# Patient Record
Sex: FEMALE | Race: WHITE | NOT HISPANIC OR LATINO | Employment: UNEMPLOYED | URBAN - METROPOLITAN AREA
[De-identification: names, ages, dates, MRNs, and addresses within clinical notes are randomized per-mention and may not be internally consistent; named-entity substitution may affect disease eponyms.]

---

## 2018-04-26 ENCOUNTER — APPOINTMENT (OUTPATIENT)
Dept: RADIOLOGY | Facility: MEDICAL CENTER | Age: 28
DRG: 446 | End: 2018-04-26
Attending: EMERGENCY MEDICINE
Payer: MEDICAID

## 2018-04-26 ENCOUNTER — HOSPITAL ENCOUNTER (INPATIENT)
Facility: MEDICAL CENTER | Age: 28
LOS: 4 days | DRG: 446 | End: 2018-04-30
Attending: EMERGENCY MEDICINE | Admitting: INTERNAL MEDICINE
Payer: MEDICAID

## 2018-04-26 DIAGNOSIS — R79.89 LFT ELEVATION: ICD-10-CM

## 2018-04-26 DIAGNOSIS — R10.11 RUQ PAIN: ICD-10-CM

## 2018-04-26 PROBLEM — K76.0 HEPATIC STEATOSIS: Status: ACTIVE | Noted: 2018-04-26

## 2018-04-26 LAB
ALBUMIN SERPL BCP-MCNC: 4.4 G/DL (ref 3.2–4.9)
ALBUMIN/GLOB SERPL: 1.5 G/DL
ALP SERPL-CCNC: 144 U/L (ref 30–99)
ALT SERPL-CCNC: 569 U/L (ref 2–50)
ANION GAP SERPL CALC-SCNC: 12 MMOL/L (ref 0–11.9)
APPEARANCE UR: ABNORMAL
AST SERPL-CCNC: 236 U/L (ref 12–45)
BACTERIA #/AREA URNS HPF: ABNORMAL /HPF
BASOPHILS # BLD AUTO: 0.7 % (ref 0–1.8)
BASOPHILS # BLD: 0.05 K/UL (ref 0–0.12)
BILIRUB SERPL-MCNC: 3.3 MG/DL (ref 0.1–1.5)
BILIRUB UR QL STRIP.AUTO: ABNORMAL
BUN SERPL-MCNC: 10 MG/DL (ref 8–22)
CALCIUM SERPL-MCNC: 9.8 MG/DL (ref 8.5–10.5)
CHLORIDE SERPL-SCNC: 104 MMOL/L (ref 96–112)
CO2 SERPL-SCNC: 23 MMOL/L (ref 20–33)
COLOR UR: ABNORMAL
CREAT SERPL-MCNC: 0.84 MG/DL (ref 0.5–1.4)
EOSINOPHIL # BLD AUTO: 0.3 K/UL (ref 0–0.51)
EOSINOPHIL NFR BLD: 4.2 % (ref 0–6.9)
EPI CELLS #/AREA URNS HPF: ABNORMAL /HPF
ERYTHROCYTE [DISTWIDTH] IN BLOOD BY AUTOMATED COUNT: 45.6 FL (ref 35.9–50)
GLOBULIN SER CALC-MCNC: 3 G/DL (ref 1.9–3.5)
GLUCOSE SERPL-MCNC: 92 MG/DL (ref 65–99)
GLUCOSE UR STRIP.AUTO-MCNC: NEGATIVE MG/DL
HCG SERPL QL: NEGATIVE
HCG UR QL: NEGATIVE
HCT VFR BLD AUTO: 42.7 % (ref 37–47)
HGB BLD-MCNC: 14.3 G/DL (ref 12–16)
IMM GRANULOCYTES # BLD AUTO: 0.03 K/UL (ref 0–0.11)
IMM GRANULOCYTES NFR BLD AUTO: 0.4 % (ref 0–0.9)
KETONES UR STRIP.AUTO-MCNC: >=160 MG/DL
LEUKOCYTE ESTERASE UR QL STRIP.AUTO: ABNORMAL
LIPASE SERPL-CCNC: 22 U/L (ref 11–82)
LYMPHOCYTES # BLD AUTO: 0.9 K/UL (ref 1–4.8)
LYMPHOCYTES NFR BLD: 12.5 % (ref 22–41)
MCH RBC QN AUTO: 30.4 PG (ref 27–33)
MCHC RBC AUTO-ENTMCNC: 33.5 G/DL (ref 33.6–35)
MCV RBC AUTO: 90.7 FL (ref 81.4–97.8)
MICRO URNS: ABNORMAL
MONOCYTES # BLD AUTO: 0.42 K/UL (ref 0–0.85)
MONOCYTES NFR BLD AUTO: 5.8 % (ref 0–13.4)
NEUTROPHILS # BLD AUTO: 5.5 K/UL (ref 2–7.15)
NEUTROPHILS NFR BLD: 76.4 % (ref 44–72)
NITRITE UR QL STRIP.AUTO: POSITIVE
NRBC # BLD AUTO: 0 K/UL
NRBC BLD-RTO: 0 /100 WBC
PH UR STRIP.AUTO: 5 [PH]
PLATELET # BLD AUTO: 268 K/UL (ref 164–446)
PMV BLD AUTO: 9.5 FL (ref 9–12.9)
POTASSIUM SERPL-SCNC: 4.1 MMOL/L (ref 3.6–5.5)
PROT SERPL-MCNC: 7.4 G/DL (ref 6–8.2)
PROT UR QL STRIP: NEGATIVE MG/DL
RBC # BLD AUTO: 4.71 M/UL (ref 4.2–5.4)
RBC # URNS HPF: ABNORMAL /HPF
RBC UR QL AUTO: NEGATIVE
SODIUM SERPL-SCNC: 139 MMOL/L (ref 135–145)
SP GR UR REFRACTOMETRY: 1.03
SP GR UR STRIP.AUTO: 1.03
UROBILINOGEN UR STRIP.AUTO-MCNC: 1 MG/DL
WBC # BLD AUTO: 7.2 K/UL (ref 4.8–10.8)
WBC #/AREA URNS HPF: ABNORMAL /HPF

## 2018-04-26 PROCEDURE — 84703 CHORIONIC GONADOTROPIN ASSAY: CPT

## 2018-04-26 PROCEDURE — 96375 TX/PRO/DX INJ NEW DRUG ADDON: CPT

## 2018-04-26 PROCEDURE — 85025 COMPLETE CBC W/AUTO DIFF WBC: CPT

## 2018-04-26 PROCEDURE — 700105 HCHG RX REV CODE 258: Performed by: INTERNAL MEDICINE

## 2018-04-26 PROCEDURE — 700105 HCHG RX REV CODE 258: Performed by: EMERGENCY MEDICINE

## 2018-04-26 PROCEDURE — 36415 COLL VENOUS BLD VENIPUNCTURE: CPT

## 2018-04-26 PROCEDURE — 76705 ECHO EXAM OF ABDOMEN: CPT

## 2018-04-26 PROCEDURE — 770006 HCHG ROOM/CARE - MED/SURG/GYN SEMI*

## 2018-04-26 PROCEDURE — 99223 1ST HOSP IP/OBS HIGH 75: CPT | Performed by: INTERNAL MEDICINE

## 2018-04-26 PROCEDURE — 700117 HCHG RX CONTRAST REV CODE 255: Performed by: EMERGENCY MEDICINE

## 2018-04-26 PROCEDURE — 96374 THER/PROPH/DIAG INJ IV PUSH: CPT

## 2018-04-26 PROCEDURE — 700111 HCHG RX REV CODE 636 W/ 250 OVERRIDE (IP): Performed by: EMERGENCY MEDICINE

## 2018-04-26 PROCEDURE — 96361 HYDRATE IV INFUSION ADD-ON: CPT

## 2018-04-26 PROCEDURE — 700111 HCHG RX REV CODE 636 W/ 250 OVERRIDE (IP): Performed by: INTERNAL MEDICINE

## 2018-04-26 PROCEDURE — 74177 CT ABD & PELVIS W/CONTRAST: CPT

## 2018-04-26 PROCEDURE — 80053 COMPREHEN METABOLIC PANEL: CPT

## 2018-04-26 PROCEDURE — 81001 URINALYSIS AUTO W/SCOPE: CPT

## 2018-04-26 PROCEDURE — 83690 ASSAY OF LIPASE: CPT

## 2018-04-26 PROCEDURE — 99285 EMERGENCY DEPT VISIT HI MDM: CPT

## 2018-04-26 PROCEDURE — 81025 URINE PREGNANCY TEST: CPT

## 2018-04-26 RX ORDER — CEFTRIAXONE 2 G/1
2 INJECTION, POWDER, FOR SOLUTION INTRAMUSCULAR; INTRAVENOUS ONCE
Status: COMPLETED | OUTPATIENT
Start: 2018-04-26 | End: 2018-04-26

## 2018-04-26 RX ORDER — KETOROLAC TROMETHAMINE 30 MG/ML
30 INJECTION, SOLUTION INTRAMUSCULAR; INTRAVENOUS EVERY 6 HOURS PRN
Status: DISCONTINUED | OUTPATIENT
Start: 2018-04-26 | End: 2018-04-30 | Stop reason: HOSPADM

## 2018-04-26 RX ORDER — AMOXICILLIN 500 MG/1
500 CAPSULE ORAL 4 TIMES DAILY
COMMUNITY
Start: 2018-04-09

## 2018-04-26 RX ORDER — SODIUM CHLORIDE 9 MG/ML
1000 INJECTION, SOLUTION INTRAVENOUS ONCE
Status: COMPLETED | OUTPATIENT
Start: 2018-04-26 | End: 2018-04-26

## 2018-04-26 RX ORDER — PROMETHAZINE HYDROCHLORIDE 25 MG/1
12.5-25 TABLET ORAL EVERY 4 HOURS PRN
Status: DISCONTINUED | OUTPATIENT
Start: 2018-04-26 | End: 2018-04-30 | Stop reason: HOSPADM

## 2018-04-26 RX ORDER — ONDANSETRON 4 MG/1
4 TABLET, ORALLY DISINTEGRATING ORAL EVERY 4 HOURS PRN
Status: DISCONTINUED | OUTPATIENT
Start: 2018-04-26 | End: 2018-04-30 | Stop reason: HOSPADM

## 2018-04-26 RX ORDER — PROMETHAZINE HYDROCHLORIDE 25 MG/1
12.5-25 SUPPOSITORY RECTAL EVERY 4 HOURS PRN
Status: DISCONTINUED | OUTPATIENT
Start: 2018-04-26 | End: 2018-04-30 | Stop reason: HOSPADM

## 2018-04-26 RX ORDER — BISACODYL 10 MG
10 SUPPOSITORY, RECTAL RECTAL
Status: DISCONTINUED | OUTPATIENT
Start: 2018-04-26 | End: 2018-04-30 | Stop reason: HOSPADM

## 2018-04-26 RX ORDER — IBUPROFEN 800 MG/1
800 TABLET ORAL EVERY 8 HOURS PRN
COMMUNITY

## 2018-04-26 RX ORDER — SODIUM CHLORIDE 9 MG/ML
INJECTION, SOLUTION INTRAVENOUS CONTINUOUS
Status: DISPENSED | OUTPATIENT
Start: 2018-04-26 | End: 2018-04-27

## 2018-04-26 RX ORDER — ONDANSETRON 2 MG/ML
4 INJECTION INTRAMUSCULAR; INTRAVENOUS ONCE
Status: COMPLETED | OUTPATIENT
Start: 2018-04-26 | End: 2018-04-26

## 2018-04-26 RX ORDER — DOXYCYCLINE HYCLATE 100 MG
100 TABLET ORAL 2 TIMES DAILY
COMMUNITY
Start: 2018-04-26

## 2018-04-26 RX ORDER — POLYETHYLENE GLYCOL 3350 17 G/17G
1 POWDER, FOR SOLUTION ORAL
Status: DISCONTINUED | OUTPATIENT
Start: 2018-04-26 | End: 2018-04-30 | Stop reason: HOSPADM

## 2018-04-26 RX ORDER — ACETAMINOPHEN 325 MG/1
650 TABLET ORAL EVERY 6 HOURS PRN
Status: DISCONTINUED | OUTPATIENT
Start: 2018-04-26 | End: 2018-04-30 | Stop reason: HOSPADM

## 2018-04-26 RX ORDER — ONDANSETRON 2 MG/ML
4 INJECTION INTRAMUSCULAR; INTRAVENOUS EVERY 4 HOURS PRN
Status: DISCONTINUED | OUTPATIENT
Start: 2018-04-26 | End: 2018-04-30 | Stop reason: HOSPADM

## 2018-04-26 RX ORDER — AMOXICILLIN 250 MG
2 CAPSULE ORAL 2 TIMES DAILY
Status: DISCONTINUED | OUTPATIENT
Start: 2018-04-26 | End: 2018-04-30 | Stop reason: HOSPADM

## 2018-04-26 RX ORDER — MORPHINE SULFATE 4 MG/ML
1 INJECTION, SOLUTION INTRAMUSCULAR; INTRAVENOUS EVERY 4 HOURS PRN
Status: DISCONTINUED | OUTPATIENT
Start: 2018-04-26 | End: 2018-04-30 | Stop reason: HOSPADM

## 2018-04-26 RX ADMIN — SODIUM CHLORIDE: 9 INJECTION, SOLUTION INTRAVENOUS at 20:57

## 2018-04-26 RX ADMIN — ONDANSETRON 4 MG: 2 INJECTION, SOLUTION INTRAMUSCULAR; INTRAVENOUS at 17:28

## 2018-04-26 RX ADMIN — IOHEXOL 100 ML: 350 INJECTION, SOLUTION INTRAVENOUS at 18:27

## 2018-04-26 RX ADMIN — ONDANSETRON HYDROCHLORIDE 4 MG: 2 INJECTION, SOLUTION INTRAMUSCULAR; INTRAVENOUS at 23:19

## 2018-04-26 RX ADMIN — SODIUM CHLORIDE 1000 ML: 9 INJECTION, SOLUTION INTRAVENOUS at 15:38

## 2018-04-26 RX ADMIN — CEFTRIAXONE SODIUM 2 G: 2 INJECTION, POWDER, FOR SOLUTION INTRAMUSCULAR; INTRAVENOUS at 17:20

## 2018-04-26 RX ADMIN — KETOROLAC TROMETHAMINE 30 MG: 30 INJECTION, SOLUTION INTRAMUSCULAR at 23:04

## 2018-04-26 ASSESSMENT — ENCOUNTER SYMPTOMS
HEARTBURN: 0
BLURRED VISION: 0
COUGH: 0
FEVER: 0
FLANK PAIN: 0
HEADACHES: 0
ABDOMINAL PAIN: 1
VOMITING: 1
PALPITATIONS: 0
SHORTNESS OF BREATH: 0
HEMOPTYSIS: 0
CHILLS: 0
LOSS OF CONSCIOUSNESS: 0
WEAKNESS: 0
NAUSEA: 1
SPUTUM PRODUCTION: 0
DEPRESSION: 0
TINGLING: 0
DIARRHEA: 0
DIZZINESS: 0
WEIGHT LOSS: 0
VOMITING: 0
CONSTIPATION: 0
NECK PAIN: 0
MYALGIAS: 0
FOCAL WEAKNESS: 0

## 2018-04-26 ASSESSMENT — PAIN SCALES - GENERAL
PAINLEVEL_OUTOF10: 0
PAINLEVEL_OUTOF10: 0
PAINLEVEL_OUTOF10: 9
PAINLEVEL_OUTOF10: 5
PAINLEVEL_OUTOF10: 0

## 2018-04-26 ASSESSMENT — PATIENT HEALTH QUESTIONNAIRE - PHQ9
SUM OF ALL RESPONSES TO PHQ9 QUESTIONS 1 AND 2: 0
2. FEELING DOWN, DEPRESSED, IRRITABLE, OR HOPELESS: NOT AT ALL
1. LITTLE INTEREST OR PLEASURE IN DOING THINGS: NOT AT ALL

## 2018-04-26 ASSESSMENT — LIFESTYLE VARIABLES: DO YOU DRINK ALCOHOL: NO

## 2018-04-26 ASSESSMENT — PAIN SCALES - WONG BAKER
WONGBAKER_NUMERICALRESPONSE: DOESN'T HURT AT ALL
WONGBAKER_NUMERICALRESPONSE: DOESN'T HURT AT ALL

## 2018-04-26 NOTE — ED TRIAGE NOTES
"Chief Complaint   Patient presents with   • RUQ Pain     off and on since monday. Radiates to bad   • N/V     2-3 x emesis a day.      Pt was seen in [place] for same and told she had elevated liver enzymes.  Pt had her gallbladder removed in January.   /80   Pulse 81   Temp 37 °C (98.6 °F) (Temporal)   Resp 18   Ht 1.702 m (5' 7\")   Wt 95.7 kg (211 lb)   SpO2 96%   BMI 33.05 kg/m²   Pt placed back in lobby, educated on triage process, and told to inform staff of any change in condition.     "

## 2018-04-26 NOTE — ED PROVIDER NOTES
ED Provider Note    Scribed for Cruz Perez M.D. by Greer Pizarro. 4/26/2018, 3:09 PM.    Primary care provider: No primary care provider on file.  Means of arrival: Walk-in/Wheel-chair  History obtained from: Patient  History limited by: None     CHIEF COMPLAINT  Chief Complaint   Patient presents with   • RUQ Pain     off and on since monday. Radiates to bad   • N/V     2-3 x emesis a day.      HPI  Nila Ferro is a 27 y.o. female who presents to the Emergency Department for evaluation of intermittent right upper quadrant abdominal pain which onset three days ago. Other recent symptoms include nausea and vomiting. She reports having roughly 2-3 episodes per day. Patient reports she initially had back pain approximately five days ago and the abdominal pain began two days later. She was evaluated at [facility] and was informed her liver enzymes are elevated. The provider at [facility] wanted to perform an ultrasound but that patient was in too much pain and was advised to come to the ED. Patient was going to be Care Flighted in but her pain temporarily improved, so she was able to drive herself. She lives 2-3 hours outside of [place]. She notices her pain will onset and worsen after eating and improves after vomiting. She denies diarrhea, constipation and dysuria. She has history of cholecystectomy. Patient denies smoking cigarettes. She reports drinking occasionally. Patient has a two year old daughter.     REVIEW OF SYSTEMS  Review of Systems   Gastrointestinal: Positive for abdominal pain, nausea and vomiting. Negative for constipation and diarrhea.   Genitourinary: Negative for dysuria.   All other systems reviewed and are negative.  C.    PAST MEDICAL HISTORY   No chronic illnesses reported.     SURGICAL HISTORY  Cholecystectomy.     SOCIAL HISTORY  Social History   Substance Use Topics   • Smoking status: Never Smoker   • Smokeless tobacco: Never Used   • Alcohol use No      History  "  Drug Use No       FAMILY HISTORY  History reviewed. No pertinent family history.    CURRENT MEDICATIONS  Patient denies taking any daily medications.      ALLERGIES  No Known Allergies    PHYSICAL EXAM  VITAL SIGNS: /80   Pulse 81   Temp 37 °C (98.6 °F) (Temporal)   Resp 18   Ht 1.702 m (5' 7\")   Wt 95.7 kg (211 lb)   SpO2 96%   BMI 33.05 kg/m²   Vitals reviewed.  Constitutional: Well developed, Well nourished, No acute distress, Non-toxic appearance.   HENT: Normocephalic, Atraumatic, Bilateral external ears normal, Oropharynx dry, No oral exudates, Nose normal.   Eyes: PERRL, EOMI, Conjunctiva normal, No discharge.   Neck: Normal range of motion, No tenderness, Supple, No stridor.   Cardiovascular: Normal heart rate, Normal rhythm, No murmurs, No rubs, No gallops.   Thorax & Lungs: Normal breath sounds, No respiratory distress, No wheezing, No chest tenderness.   Abdomen: Bowel sounds normal, Soft, tender the right upper quadrant.  No peritonitis.  Skin: Warm, Dry, No erythema, No rash.   Back: No tenderness, No CVA tenderness.   Genitalia: Deferred.   Rectal: Deferred.   Musculoskeletal: Good range of motion in all major joints. No edema. No tenderness to palpation or major deformities noted.   Neurologic: Alert, Normal motor function, Normal sensory function, No focal deficits noted.   Psychiatric: Affect normal    LABS  Results for orders placed or performed during the hospital encounter of 04/26/18   CBC WITH DIFFERENTIAL   Result Value Ref Range    WBC 7.2 4.8 - 10.8 K/uL    RBC 4.71 4.20 - 5.40 M/uL    Hemoglobin 14.3 12.0 - 16.0 g/dL    Hematocrit 42.7 37.0 - 47.0 %    MCV 90.7 81.4 - 97.8 fL    MCH 30.4 27.0 - 33.0 pg    MCHC 33.5 (L) 33.6 - 35.0 g/dL    RDW 45.6 35.9 - 50.0 fL    Platelet Count 268 164 - 446 K/uL    MPV 9.5 9.0 - 12.9 fL    Neutrophils-Polys 76.40 (H) 44.00 - 72.00 %    Lymphocytes 12.50 (L) 22.00 - 41.00 %    Monocytes 5.80 0.00 - 13.40 %    Eosinophils 4.20 0.00 - 6.90 % "    Basophils 0.70 0.00 - 1.80 %    Immature Granulocytes 0.40 0.00 - 0.90 %    Nucleated RBC 0.00 /100 WBC    Neutrophils (Absolute) 5.50 2.00 - 7.15 K/uL    Lymphs (Absolute) 0.90 (L) 1.00 - 4.80 K/uL    Monos (Absolute) 0.42 0.00 - 0.85 K/uL    Eos (Absolute) 0.30 0.00 - 0.51 K/uL    Baso (Absolute) 0.05 0.00 - 0.12 K/uL    Immature Granulocytes (abs) 0.03 0.00 - 0.11 K/uL    NRBC (Absolute) 0.00 K/uL   COMP METABOLIC PANEL   Result Value Ref Range    Sodium 139 135 - 145 mmol/L    Potassium 4.1 3.6 - 5.5 mmol/L    Chloride 104 96 - 112 mmol/L    Co2 23 20 - 33 mmol/L    Anion Gap 12.0 (H) 0.0 - 11.9    Glucose 92 65 - 99 mg/dL    Bun 10 8 - 22 mg/dL    Creatinine 0.84 0.50 - 1.40 mg/dL    Calcium 9.8 8.5 - 10.5 mg/dL    AST(SGOT) 236 (H) 12 - 45 U/L    Alkaline Phosphatase 144 (H) 30 - 99 U/L    Total Bilirubin 3.3 (H) 0.1 - 1.5 mg/dL    Albumin 4.4 3.2 - 4.9 g/dL    Total Protein 7.4 6.0 - 8.2 g/dL    Globulin 3.0 1.9 - 3.5 g/dL    A-G Ratio 1.5 g/dL    ALT(SGPT) 569 (H) 2 - 50 U/L   LIPASE   Result Value Ref Range    Lipase 22 11 - 82 U/L   URINALYSIS CULTURE, IF INDICATED   Result Value Ref Range    Color DK Yellow     Character Cloudy (A)     Specific Gravity 1.029 <1.035    Ph 5.0 5.0 - 8.0    Glucose Negative Negative mg/dL    Ketones >=160 Negative mg/dL    Protein Negative Negative mg/dL    Bilirubin Large (A) Negative    Urobilinogen, Urine 1.0 Negative    Nitrite Positive (A) Negative    Leukocyte Esterase Small (A) Negative    Occult Blood Negative Negative    Micro Urine Req Microscopic    URINE MICROSCOPIC (W/UA)   Result Value Ref Range    WBC 0-2 /hpf    RBC 5-10 (A) /hpf    Bacteria Few (A) None /hpf    Epithelial Cells Moderate (A) /hpf   ESTIMATED GFR   Result Value Ref Range    GFR If African American >60 >60 mL/min/1.73 m 2    GFR If Non African American >60 >60 mL/min/1.73 m 2   BETA-HCG QUALITATIVE URINE   Result Value Ref Range    Beta-Hcg Urine Negative Negative   REFRACTOMETER SG   Result  Value Ref Range    Specific Gravity 1.030       All labs reviewed by me.    RADIOLOGY  CT-ABDOMEN-PELVIS WITH   Final Result      Diffuse low-attenuation in the liver consistent with fatty infiltration.      Status post cholecystectomy.      US-LIVER AND BILIARY TREE   Final Result      1.  Hepatic steatosis.      2.  Previous cholecystectomy.           The radiologist's interpretation of all radiological studies have been reviewed by me.    COURSE & MEDICAL DECISION MAKING  Pertinent Labs & Imaging studies reviewed. (See chart for details)    Reviewed past medical records. Patient has no previous ED visits.     3:09 PM Patient seen and examined at bedside. The patient presents with right upper quadrant pain, and the differential diagnosis includes but is not limited to biliary stone, not in the gallbladder.  Hepatitis, colitis, renal colic, UTI, pacted stone.. Ordered for US-liver and biliary tree, CT abdomen pelvis, CBC, CMP, lipase, urinalysis, POC urinalysis and POC urine pregnancy to evaluate. Patient will be resuscitated with 1 liter IV fluids secondary to her vomiting.      *Patient was given IV fluids secondary to fluid loss from poor oral intake.  She is reassessed after this and felt much better.    .  The patient has elevated LFTs suggestive of possible obstruction.  She's had her gallbladder out.  Biliary ultrasound does not show any abnormality.  CT is nondiagnostic.    The patient has a CT this shows fatty infiltration, but no other abnormality.  Urinalysis is positive for nitrates.    At this point, the patient is to be admitted for an MR CP or ERCP to exclude biliary obstruction.  The patient has abnormal LFTs and persistent episodic severe right upper quadrant pain.  She is admitted to the hospital for continued workup and treatment.    FINAL IMPRESSION  1. RUQ pain    2. LFT elevation          Greer ACOSTA (Scribe), kaden scribing for, and in the presence of, Cruz Perez,  M.D..    Electronically signed by: Greer Pizarro (Scribe), 4/26/2018    ICruz M.D. personally performed the services described in this documentation, as scribed by Greer Pizarro in my presence, and it is both accurate and complete.    The note accurately reflects work and decisions made by me.  Cruz Perez  4/26/2018  6:39 PM

## 2018-04-27 ENCOUNTER — APPOINTMENT (OUTPATIENT)
Dept: RADIOLOGY | Facility: MEDICAL CENTER | Age: 28
DRG: 446 | End: 2018-04-27
Attending: INTERNAL MEDICINE
Payer: MEDICAID

## 2018-04-27 LAB
ALBUMIN SERPL BCP-MCNC: 3.7 G/DL (ref 3.2–4.9)
ALBUMIN/GLOB SERPL: 1.4 G/DL
ALP SERPL-CCNC: 147 U/L (ref 30–99)
ALT SERPL-CCNC: 441 U/L (ref 2–50)
ANION GAP SERPL CALC-SCNC: 13 MMOL/L (ref 0–11.9)
AST SERPL-CCNC: 150 U/L (ref 12–45)
BASOPHILS # BLD AUTO: 0.7 % (ref 0–1.8)
BASOPHILS # BLD: 0.04 K/UL (ref 0–0.12)
BILIRUB SERPL-MCNC: 2.4 MG/DL (ref 0.1–1.5)
BUN SERPL-MCNC: 10 MG/DL (ref 8–22)
CALCIUM SERPL-MCNC: 8.8 MG/DL (ref 8.5–10.5)
CHLORIDE SERPL-SCNC: 107 MMOL/L (ref 96–112)
CO2 SERPL-SCNC: 19 MMOL/L (ref 20–33)
CREAT SERPL-MCNC: 0.83 MG/DL (ref 0.5–1.4)
EOSINOPHIL # BLD AUTO: 0.15 K/UL (ref 0–0.51)
EOSINOPHIL NFR BLD: 2.5 % (ref 0–6.9)
ERYTHROCYTE [DISTWIDTH] IN BLOOD BY AUTOMATED COUNT: 47.7 FL (ref 35.9–50)
GLOBULIN SER CALC-MCNC: 2.7 G/DL (ref 1.9–3.5)
GLUCOSE SERPL-MCNC: 67 MG/DL (ref 65–99)
HCT VFR BLD AUTO: 41.1 % (ref 37–47)
HGB BLD-MCNC: 13.3 G/DL (ref 12–16)
IMM GRANULOCYTES # BLD AUTO: 0.02 K/UL (ref 0–0.11)
IMM GRANULOCYTES NFR BLD AUTO: 0.3 % (ref 0–0.9)
LYMPHOCYTES # BLD AUTO: 1.3 K/UL (ref 1–4.8)
LYMPHOCYTES NFR BLD: 21.5 % (ref 22–41)
MAGNESIUM SERPL-MCNC: 1.9 MG/DL (ref 1.5–2.5)
MCH RBC QN AUTO: 30.4 PG (ref 27–33)
MCHC RBC AUTO-ENTMCNC: 32.4 G/DL (ref 33.6–35)
MCV RBC AUTO: 94.1 FL (ref 81.4–97.8)
MONOCYTES # BLD AUTO: 0.35 K/UL (ref 0–0.85)
MONOCYTES NFR BLD AUTO: 5.8 % (ref 0–13.4)
NEUTROPHILS # BLD AUTO: 4.18 K/UL (ref 2–7.15)
NEUTROPHILS NFR BLD: 69.2 % (ref 44–72)
NRBC # BLD AUTO: 0 K/UL
NRBC BLD-RTO: 0 /100 WBC
PLATELET # BLD AUTO: 238 K/UL (ref 164–446)
PMV BLD AUTO: 9.6 FL (ref 9–12.9)
POTASSIUM SERPL-SCNC: 4.1 MMOL/L (ref 3.6–5.5)
PROT SERPL-MCNC: 6.4 G/DL (ref 6–8.2)
RBC # BLD AUTO: 4.37 M/UL (ref 4.2–5.4)
SODIUM SERPL-SCNC: 139 MMOL/L (ref 135–145)
WBC # BLD AUTO: 6 K/UL (ref 4.8–10.8)

## 2018-04-27 PROCEDURE — 700111 HCHG RX REV CODE 636 W/ 250 OVERRIDE (IP): Performed by: INTERNAL MEDICINE

## 2018-04-27 PROCEDURE — 80053 COMPREHEN METABOLIC PANEL: CPT

## 2018-04-27 PROCEDURE — 74181 MRI ABDOMEN W/O CONTRAST: CPT

## 2018-04-27 PROCEDURE — 700105 HCHG RX REV CODE 258: Performed by: INTERNAL MEDICINE

## 2018-04-27 PROCEDURE — 83735 ASSAY OF MAGNESIUM: CPT

## 2018-04-27 PROCEDURE — 36415 COLL VENOUS BLD VENIPUNCTURE: CPT

## 2018-04-27 PROCEDURE — 85025 COMPLETE CBC W/AUTO DIFF WBC: CPT

## 2018-04-27 PROCEDURE — 99232 SBSQ HOSP IP/OBS MODERATE 35: CPT | Performed by: HOSPITALIST

## 2018-04-27 PROCEDURE — 770006 HCHG ROOM/CARE - MED/SURG/GYN SEMI*

## 2018-04-27 RX ORDER — SODIUM CHLORIDE 9 MG/ML
INJECTION, SOLUTION INTRAVENOUS CONTINUOUS
Status: DISCONTINUED | OUTPATIENT
Start: 2018-04-27 | End: 2018-04-30 | Stop reason: HOSPADM

## 2018-04-27 RX ADMIN — ONDANSETRON HYDROCHLORIDE 4 MG: 2 INJECTION, SOLUTION INTRAMUSCULAR; INTRAVENOUS at 13:04

## 2018-04-27 RX ADMIN — MORPHINE SULFATE 1 MG: 4 INJECTION INTRAVENOUS at 09:27

## 2018-04-27 RX ADMIN — SODIUM CHLORIDE: 9 INJECTION, SOLUTION INTRAVENOUS at 08:13

## 2018-04-27 RX ADMIN — SODIUM CHLORIDE: 9 INJECTION, SOLUTION INTRAVENOUS at 16:42

## 2018-04-27 RX ADMIN — ONDANSETRON HYDROCHLORIDE 4 MG: 2 INJECTION, SOLUTION INTRAMUSCULAR; INTRAVENOUS at 19:13

## 2018-04-27 RX ADMIN — PROCHLORPERAZINE EDISYLATE 10 MG: 5 INJECTION INTRAMUSCULAR; INTRAVENOUS at 21:05

## 2018-04-27 RX ADMIN — MORPHINE SULFATE 1 MG: 4 INJECTION INTRAVENOUS at 16:39

## 2018-04-27 RX ADMIN — SODIUM CHLORIDE: 9 INJECTION, SOLUTION INTRAVENOUS at 21:06

## 2018-04-27 RX ADMIN — ONDANSETRON HYDROCHLORIDE 4 MG: 2 INJECTION, SOLUTION INTRAMUSCULAR; INTRAVENOUS at 09:18

## 2018-04-27 ASSESSMENT — ENCOUNTER SYMPTOMS
RESPIRATORY NEGATIVE: 1
NAUSEA: 1
VOMITING: 0
ABDOMINAL PAIN: 1
NEUROLOGICAL NEGATIVE: 1
PSYCHIATRIC NEGATIVE: 1
CARDIOVASCULAR NEGATIVE: 1
MUSCULOSKELETAL NEGATIVE: 1

## 2018-04-27 ASSESSMENT — PAIN SCALES - GENERAL
PAINLEVEL_OUTOF10: 0
PAINLEVEL_OUTOF10: 7
PAINLEVEL_OUTOF10: 1

## 2018-04-27 NOTE — ED NOTES
Med Rec completed per Pt at bedside  Allergies Reviewed  Ok per Pt to discuss medications with visitor/s present    Pt started 10 day course of DOXYCYCLINE 4/26    Pt reports taking 3 days of a 7 day course of AMOXIL 4/9-4/11

## 2018-04-27 NOTE — ED NOTES
RECEIVED REPORT FROM RN, PT IS AAOX4, PT IS IN NAD, PT HAS NO PAIN  AT THIS TIME. PT IS BEING ADMITTED, PT WAS TOLD OF POC.

## 2018-04-27 NOTE — CARE PLAN
Problem: Communication  Goal: The ability to communicate needs accurately and effectively will improve  Outcome: PROGRESSING AS EXPECTED      Problem: Knowledge Deficit  Goal: Knowledge of disease process/condition, treatment plan, diagnostic tests, and medications will improve  Outcome: PROGRESSING AS EXPECTED    Goal: Knowledge of the prescribed therapeutic regimen will improve  Outcome: PROGRESSING AS EXPECTED

## 2018-04-27 NOTE — PROGRESS NOTES
28 yo F  Full Code  NKA  Admit 4/26- RUQ pain with N/V for 3 days    Assessment done    A+Ox4    RA    Pt states pain 1/10- declines medication    Hypoactive BS x4  LBM PTA, 4/25  NPO, sips with meds    R AC 20g PIV CDI, NS running at 100 ml/hr    Bed low and locked, call light and belongings in reach, hourly rounding in place, pt calls appropriately for assistance    No fall risk per Ellen Jacobs assessment    Discusses POC- MRI (MRI sheet complete) and possible ERCP    All needs met at this time

## 2018-04-27 NOTE — H&P
Hospital Medicine History and Physical    Date of Service  4/26/2018    Chief Complaint  Chief Complaint   Patient presents with   • RUQ Pain     off and on since monday. Radiates to bad   • N/V     2-3 x emesis a day.        History of Presenting Illness  27 y.o. female who presented 4/26/2018 with above chief complaint. Patient presents all the way from [facility] and she's had recurrent right upper quadrant pain with associated nausea and vomiting for the last several days. Patient had presented to outside hospital this past Monday in [facility] where she apparently had normal labs but no imaging was sent home with supportive treatment. She describes a right upper quadrant pain as stabbing in nature that comes and goes and is episodic and sometimes wraps around to her back pain seems to be exacerbated by food intake and relieved by no food. She was trying to take some ibuprofen home with little relief and denies any obvious jaundice of skin. She says this pain feels somewhat similar to her prior cholecystectomy. She denies any associated diarrhea but does have some nausea and vomiting with it and the pain at home was a 10 out of 10 and is currently a 5 out of 10. Given that the pain was so severe she finally took one of her mom's Norco's which didn't subside the pain a bit.      Primary Care Physician  No primary care provider on file.    Code Status  fc/fc    Review of Systems  Review of Systems   Constitutional: Positive for malaise/fatigue. Negative for chills, fever and weight loss.   HENT: Negative for hearing loss.    Eyes: Negative for blurred vision.   Respiratory: Negative for cough, hemoptysis, sputum production and shortness of breath.    Cardiovascular: Negative for chest pain, palpitations and leg swelling.   Gastrointestinal: Positive for abdominal pain and nausea. Negative for diarrhea, heartburn and vomiting.   Genitourinary: Negative for dysuria, flank pain and urgency.   Musculoskeletal:  Negative for myalgias and neck pain.   Neurological: Negative for dizziness, tingling, focal weakness, loss of consciousness, weakness and headaches.   Psychiatric/Behavioral: Negative for depression.   All other systems reviewed and are negative.         Past Medical History  Prior gallbladder issues    Surgical History  Laparoscopic cholecystectomy in 2018    Medications  No current facility-administered medications on file prior to encounter.      No current outpatient prescriptions on file prior to encounter.       Family History  COPD    Social History  Social History   Substance Use Topics   • Smoking status: Never Smoker   • Smokeless tobacco: Never Used   • Alcohol use No       Allergies  No Known Allergies     Physical Exam  Laboratory   Hemodynamics  Temp (24hrs), Av.8 °C (98.3 °F), Min:36.1 °C (97 °F), Max:37.1 °C (98.8 °F)   Temperature: 36.1 °C (97 °F)  Pulse  Av  Min: 65  Max: 81    Blood Pressure: 113/63, NIBP: (!) 95/57      Respiratory      Respiration: 18, Pulse Oximetry: 95 %             Physical Exam   Constitutional: She is oriented to person, place, and time. She appears well-developed and well-nourished. No distress.   HENT:   Head: Normocephalic and atraumatic.   Mouth/Throat: No oropharyngeal exudate.   Eyes: Pupils are equal, round, and reactive to light. Scleral icterus (very faint) is present.   Neck: Normal range of motion. Neck supple. No thyromegaly present.   Cardiovascular: Normal rate, regular rhythm, normal heart sounds and intact distal pulses.    No murmur heard.  Pulmonary/Chest: Effort normal and breath sounds normal. No respiratory distress. She has no wheezes.   Abdominal: Soft. Bowel sounds are normal. She exhibits no distension. There is tenderness. There is no rebound and no guarding.   +galdamez's sign     Musculoskeletal: Normal range of motion. She exhibits no edema or tenderness.   Neurological: She is alert and oriented to person, place, and time. No  cranial nerve deficit.   Skin: Skin is warm and dry. No rash noted.   Psychiatric: She has a normal mood and affect.   Nursing note and vitals reviewed.      Recent Labs      04/26/18   1536   WBC  7.2   RBC  4.71   HEMOGLOBIN  14.3   HEMATOCRIT  42.7   MCV  90.7   MCH  30.4   MCHC  33.5*   RDW  45.6   PLATELETCT  268   MPV  9.5     Recent Labs      04/26/18   1536   SODIUM  139   POTASSIUM  4.1   CHLORIDE  104   CO2  23   GLUCOSE  92   BUN  10   CREATININE  0.84   CALCIUM  9.8                   Imaging  CT-ABDOMEN-PELVIS WITH   Final Result      Diffuse low-attenuation in the liver consistent with fatty infiltration.      Status post cholecystectomy.      US-LIVER AND BILIARY TREE   Final Result      1.  Hepatic steatosis.      2.  Previous cholecystectomy.         [id number]    (Results Pending)        Assessment/Plan     I anticipate this patient will require at least two midnights for appropriate medical management, necessitating inpatient admission.    * RUQ pain- (present on admission)   Assessment & Plan    -Certainly concerning for possible passed gallstone and associated choledocholithiasis, imaging here shows fatty liver and normal common bile duct with no obvious cholelithiasis  -Given her symptoms and elevated LFTs serially concerning that she passed a gallstone. She is already status post cholecystectomy  -We'll perform MRCP  -Trend LFTs and bilirubin  -No evidence of clear infections assigned therefore defer antibiotics  -If MRCP is positive we'll need to consult GI for possible ERCP  -Pain control and IV fluids and nothing by mouth        Hepatic steatosis- (present on admission)   Assessment & Plan    -Secondary to metabolic syndrome        Elevated LFTs- (present on admission)   Assessment & Plan    -See above            VTE prophylaxis SCD's.

## 2018-04-27 NOTE — PROGRESS NOTES
Assume pt care. Pt a/o x3, VSS, No acute issues overnight. Pt rounds Q1-2hr with assessment of 4p's. IV assessment and care given. Pt education provided base on admission, care plan, current medications, and PRN. Pt admit for RUQ pain, N/V x4 days and elevated LFTs. Pt has hx of lap wong. NPO since admission. Pending MRI tomorrow.     2 RN skin check - no skin breakdown noted.     Results for NUZHAT MARIEE (MRN [id number]) as of 4/27/2018 05:19   Ref. Range 4/26/2018 15:36    4/27/2018 04:12   AST(SGOT) Latest Ref Range: 12 - 45 U/L 236 (H)    150 (H)   ALT(SGPT) Latest Ref Range: 2 - 50 U/L 569 (H)    441 (H)       PLAN -   We'll perform MRCP  -Trend LFTs and bilirubin  -No evidence of clear infections assigned therefore defer antibiotics  -If MRCP is positive we'll need to consult GI for possible ERCP  -Pain control and IV fluids and nothing by mouth

## 2018-04-27 NOTE — PROGRESS NOTES
[facility] Hospitalist Progress Note    Date of Service: 2018    Chief Complaint  27 y.o. female admitted 2018 with ruq abdominal pain, n/v    Interval Problem Update  Still has nausea    ruq pain, sharp, intensity 6/10, constant, no radiation    Afebrile    mrcp ordered, not yet done    Consultants/Specialty  none    Disposition  home        Review of Systems   Constitutional: Positive for malaise/fatigue.   HENT: Negative.    Respiratory: Negative.    Cardiovascular: Negative.    Gastrointestinal: Positive for abdominal pain and nausea. Negative for vomiting.   Genitourinary: Negative.    Musculoskeletal: Negative.    Neurological: Negative.    Psychiatric/Behavioral: Negative.    All other systems reviewed and are negative.     Physical Exam  Laboratory/Imaging   Hemodynamics  Temp (24hrs), Av.6 °C (97.8 °F), Min:36 °C (96.8 °F), Max:37.1 °C (98.8 °F)   Temperature: 36.1 °C (97 °F)  Pulse  Av.9  Min: 65  Max: 81    Blood Pressure: 103/57, NIBP: (!) 95/57      Respiratory      Respiration: 16, Pulse Oximetry: 96 %             Fluids    Intake/Output Summary (Last 24 hours) at 18 1314  Last data filed at 18 0400   Gross per 24 hour   Intake             1200 ml   Output                0 ml   Net             1200 ml       Nutrition  Orders Placed This Encounter   Procedures   • Diet NPO     Standing Status:   Standing     Number of Occurrences:   1     Order Specific Question:   Restrict to:     Answer:   Sips with Medications [3]     Physical Exam   Constitutional: She is oriented to person, place, and time. No distress.   Eyes: Left eye exhibits no discharge.   Neck: No tracheal deviation present. No thyromegaly present.   Cardiovascular: Normal rate.  Exam reveals friction rub.    No murmur heard.  Pulmonary/Chest: No respiratory distress. She has no wheezes. She has no rales. She exhibits no tenderness.   Abdominal: She exhibits distension. She exhibits no mass. There is tenderness  (epigastric). There is no rebound and no guarding.   Musculoskeletal: She exhibits no edema or deformity.   Neurological: She is alert and oriented to person, place, and time. No cranial nerve deficit. Coordination normal.   Skin: No rash noted. She is not diaphoretic. No erythema. No pallor.       Recent Labs      04/26/18   1536  04/27/18   0412   WBC  7.2  6.0   RBC  4.71  4.37   HEMOGLOBIN  14.3  13.3   HEMATOCRIT  42.7  41.1   MCV  90.7  94.1   MCH  30.4  30.4   MCHC  33.5*  32.4*   RDW  45.6  47.7   PLATELETCT  268  238   MPV  9.5  9.6     Recent Labs      04/26/18   1536  04/27/18   0412   SODIUM  139  139   POTASSIUM  4.1  4.1   CHLORIDE  104  107   CO2  23  19*   GLUCOSE  92  67   BUN  10  10   CREATININE  0.84  0.83   CALCIUM  9.8  8.8                      Assessment/Plan     * RUQ pain- (present on admission)   Assessment & Plan    Ultrasound imaging here shows fatty liver and normal common bile duct with no obvious cholelithiasis  - She is already status post cholecystectomy  -We'll perform MRCP  -Trend LFTs and bilirubin  -No evidence of clear infections assigned therefore defer antibiotics  -If MRCP is positive we'll need to consult GI for possible ERCP  -Pain control and IV fluids and nothing by mouth    hydrate        Hepatic steatosis- (present on admission)   Assessment & Plan    -Secondary to metabolic syndrome        Elevated LFTs- (present on admission)   Assessment & Plan    -See above          Quality-Core Measures   Singleton catheter::  No Singleton  DVT prophylaxis pharmacological::  Not indicated at this time, ambulatory      Check am cbc  Check am cmp

## 2018-04-28 PROBLEM — K80.50 COMMON BILE DUCT STONE: Status: ACTIVE | Noted: 2018-04-26

## 2018-04-28 LAB
ALBUMIN SERPL BCP-MCNC: 3.5 G/DL (ref 3.2–4.9)
ALBUMIN/GLOB SERPL: 1.3 G/DL
ALP SERPL-CCNC: 137 U/L (ref 30–99)
ALT SERPL-CCNC: 324 U/L (ref 2–50)
ANION GAP SERPL CALC-SCNC: 9 MMOL/L (ref 0–11.9)
AST SERPL-CCNC: 121 U/L (ref 12–45)
BASOPHILS # BLD AUTO: 0.8 % (ref 0–1.8)
BASOPHILS # BLD: 0.05 K/UL (ref 0–0.12)
BILIRUB SERPL-MCNC: 1.7 MG/DL (ref 0.1–1.5)
BUN SERPL-MCNC: 6 MG/DL (ref 8–22)
CALCIUM SERPL-MCNC: 8.5 MG/DL (ref 8.5–10.5)
CHLORIDE SERPL-SCNC: 106 MMOL/L (ref 96–112)
CO2 SERPL-SCNC: 19 MMOL/L (ref 20–33)
CREAT SERPL-MCNC: 0.85 MG/DL (ref 0.5–1.4)
EOSINOPHIL # BLD AUTO: 0.25 K/UL (ref 0–0.51)
EOSINOPHIL NFR BLD: 3.8 % (ref 0–6.9)
ERYTHROCYTE [DISTWIDTH] IN BLOOD BY AUTOMATED COUNT: 46.3 FL (ref 35.9–50)
GLOBULIN SER CALC-MCNC: 2.6 G/DL (ref 1.9–3.5)
GLUCOSE SERPL-MCNC: 83 MG/DL (ref 65–99)
HCT VFR BLD AUTO: 41.3 % (ref 37–47)
HGB BLD-MCNC: 13.1 G/DL (ref 12–16)
IMM GRANULOCYTES # BLD AUTO: 0.02 K/UL (ref 0–0.11)
IMM GRANULOCYTES NFR BLD AUTO: 0.3 % (ref 0–0.9)
LYMPHOCYTES # BLD AUTO: 1.83 K/UL (ref 1–4.8)
LYMPHOCYTES NFR BLD: 27.9 % (ref 22–41)
MCH RBC QN AUTO: 29.7 PG (ref 27–33)
MCHC RBC AUTO-ENTMCNC: 31.7 G/DL (ref 33.6–35)
MCV RBC AUTO: 93.7 FL (ref 81.4–97.8)
MONOCYTES # BLD AUTO: 0.43 K/UL (ref 0–0.85)
MONOCYTES NFR BLD AUTO: 6.6 % (ref 0–13.4)
NEUTROPHILS # BLD AUTO: 3.98 K/UL (ref 2–7.15)
NEUTROPHILS NFR BLD: 60.6 % (ref 44–72)
NRBC # BLD AUTO: 0 K/UL
NRBC BLD-RTO: 0 /100 WBC
PLATELET # BLD AUTO: 231 K/UL (ref 164–446)
PMV BLD AUTO: 9.1 FL (ref 9–12.9)
POTASSIUM SERPL-SCNC: 4.3 MMOL/L (ref 3.6–5.5)
PROT SERPL-MCNC: 6.1 G/DL (ref 6–8.2)
RBC # BLD AUTO: 4.41 M/UL (ref 4.2–5.4)
SODIUM SERPL-SCNC: 134 MMOL/L (ref 135–145)
WBC # BLD AUTO: 6.6 K/UL (ref 4.8–10.8)

## 2018-04-28 PROCEDURE — 770006 HCHG ROOM/CARE - MED/SURG/GYN SEMI*

## 2018-04-28 PROCEDURE — 99232 SBSQ HOSP IP/OBS MODERATE 35: CPT | Performed by: HOSPITALIST

## 2018-04-28 PROCEDURE — 85025 COMPLETE CBC W/AUTO DIFF WBC: CPT

## 2018-04-28 PROCEDURE — 700111 HCHG RX REV CODE 636 W/ 250 OVERRIDE (IP): Performed by: INTERNAL MEDICINE

## 2018-04-28 PROCEDURE — 36415 COLL VENOUS BLD VENIPUNCTURE: CPT

## 2018-04-28 PROCEDURE — 80053 COMPREHEN METABOLIC PANEL: CPT

## 2018-04-28 PROCEDURE — 700105 HCHG RX REV CODE 258: Performed by: INTERNAL MEDICINE

## 2018-04-28 RX ADMIN — MORPHINE SULFATE 1 MG: 4 INJECTION INTRAVENOUS at 20:34

## 2018-04-28 RX ADMIN — KETOROLAC TROMETHAMINE 30 MG: 30 INJECTION, SOLUTION INTRAMUSCULAR at 18:49

## 2018-04-28 RX ADMIN — ONDANSETRON HYDROCHLORIDE 4 MG: 2 INJECTION, SOLUTION INTRAMUSCULAR; INTRAVENOUS at 11:08

## 2018-04-28 RX ADMIN — KETOROLAC TROMETHAMINE 30 MG: 30 INJECTION, SOLUTION INTRAMUSCULAR at 03:45

## 2018-04-28 RX ADMIN — MORPHINE SULFATE 1 MG: 4 INJECTION INTRAVENOUS at 12:41

## 2018-04-28 RX ADMIN — SODIUM CHLORIDE: 9 INJECTION, SOLUTION INTRAVENOUS at 13:05

## 2018-04-28 RX ADMIN — KETOROLAC TROMETHAMINE 30 MG: 30 INJECTION, SOLUTION INTRAMUSCULAR at 11:08

## 2018-04-28 RX ADMIN — SODIUM CHLORIDE: 9 INJECTION, SOLUTION INTRAVENOUS at 20:35

## 2018-04-28 RX ADMIN — ONDANSETRON HYDROCHLORIDE 4 MG: 2 INJECTION, SOLUTION INTRAMUSCULAR; INTRAVENOUS at 22:06

## 2018-04-28 ASSESSMENT — PAIN SCALES - GENERAL
PAINLEVEL_OUTOF10: 4
PAINLEVEL_OUTOF10: 5
PAINLEVEL_OUTOF10: 6
PAINLEVEL_OUTOF10: 6
PAINLEVEL_OUTOF10: 7
PAINLEVEL_OUTOF10: 5

## 2018-04-28 ASSESSMENT — ENCOUNTER SYMPTOMS
NEUROLOGICAL NEGATIVE: 1
CARDIOVASCULAR NEGATIVE: 1
ABDOMINAL PAIN: 1
NAUSEA: 1
MUSCULOSKELETAL NEGATIVE: 1
VOMITING: 0
DIARRHEA: 1
RESPIRATORY NEGATIVE: 1
PSYCHIATRIC NEGATIVE: 1

## 2018-04-28 NOTE — CONSULTS
DATE OF SERVICE:  04/28/2018    CONSULTING PHYSICIAN:  Kvng Harrell DO    REASON FOR CONSULTATION:  Choledocholithiasis.    HISTORY OF PRESENT ILLNESS:  The patient is a very pleasant 27-year-old female   who underwent laparoscopic cholecystectomy in [place], [place] approximately   01/12/2018.  Patient reports that 8 days ago she started having epigastric and   right upper quadrant abdominal pain that radiated to her back.  This pain was   associated with nausea and vomiting as well as some resolution of the pain   since then.  Patient reports that she was pain free recently; however, it is   coming back at the time of exam.  Patient denies any current nausea, vomiting.    Patient denies any fever, chills, chest pain.  Patient does have shortness   of breath during the pain episodes.  Patient denies any bloody stools.    Patient does report dark urine during this time.    PAST MEDICAL HISTORY:  Cholelithiasis.    PAST SURGICAL HISTORY:  Cholecystectomy.    FAMILY HISTORY:  Noncontributory.    SOCIAL HISTORY:  Patient admits to rare alcohol use.  Patient denies any   illicit drug use or tobacco use.    MEDICATIONS:  Patient denies any prescription or over-the-counter medications.    REVIEW OF SYSTEMS:  A 14-point review of systems was obtained and found to be   negative except those in the HPI.    PHYSICAL EXAMINATION:  GENERAL:  No acute distress, alert, oriented x3.  VITAL SIGNS:  Stable.  HEENT:  PERRLA. Extraocular movements intact.  Sclerae are anicteric.  HEART:  Regular rate and rhythm.  S1, S2.  No murmurs auscultated.  LUNGS:  Clear to auscultation bilaterally.  ABDOMEN:  Bowel sounds present x4, soft.  Positive tenderness in the   epigastrium and right upper quadrant.  The majority of it in right upper   quadrant.  No guarding or rebound.  No ascites.  EXTREMITIES:  No edema noted bilateral lower extremities.  NEUROLOGIC:  Grossly intact.  SKIN:  No jaundice.  PSYCHIATRIC:  Affect is normal.    RESULTS:   White blood cell count 6.6, , , alkaline phosphatase   137, total bilirubin 1.7, and albumin 3.5.    IMAGING:  MRCP from 04/27/2018 shows postoperative cholecystectomy, mild   intrahepatic biliary ductal dilatation and mild-to-moderate extrahepatic   biliary ductal dilatation, hepatic duct about 10 mm, bile duct 9.5 mm,   choledocholithiasis with a 5 mm calculus in the distal common bile duct.    ASSESSMENT AND PLAN:  1.  Choledocholithiasis.  Plan for endoscopic retrograde   cholangiopancreatography tomorrow.  Patient has no signs of cholangitis at   this time.  Watch closely for any worsening or please call if that happens.    No need for antibiotics as of yet.  We will plan for ERCP with sphincterotomy   and stone extraction with or without biliary stenting, depending on what we   find during the time of the procedure.  If we were able to successfully   extract the choledocholith, then patient will require no further operative   treatment.  If the patient is unable to be accessed through ERCP, may need   surgical evaluation; however, this is unlikely.  The patient understands the   risks and benefits of the procedure including perforation, infection,   bleeding, and post-ERCP pancreatitis.  The patient is at increased risk for   post-ERCP pancreatitis that she is a young female; however, we will take all   appropriate measures to try to prevent this.  Patient understands and wishes   to proceed.  2.  Abdominal pain secondary to above.  3.  Hyperbilirubinemia secondary to choledocholithiasis.  Plan for ERCP.  4.  Cholelithiasis history.  Patient is status post cholecystectomy in January 2018.  5.  Clear liquids okay for today.  Patient needs to be n.p.o. after midnight   in anticipation of ERCP tomorrow.       ____________________________________     DO FLAVIA Cruz / NIKOLE    DD:  04/28/2018 12:29:39  DT:  04/28/2018 12:44:22    D#:  [id number]  Job#:  [id number]

## 2018-04-28 NOTE — CARE PLAN
Problem: Communication  Goal: The ability to communicate needs accurately and effectively will improve  Outcome: PROGRESSING SLOWER THAN EXPECTED  Updated in plan of care, needs to see a GI.     Problem: Infection  Goal: Will remain free from infection  Outcome: PROGRESSING AS EXPECTED  No infection continue to monitor.

## 2018-04-28 NOTE — PROGRESS NOTES
Patient AxO x4, VSS, medicated for nausea with compazine. Patient on a clear liquid diet until midnight, then will be NPO. Patient voiding adequately, last BM PTA. Patient up self, ambulating well. All needs met at this time, call light in reach.

## 2018-04-28 NOTE — CARE PLAN
Problem: Discharge Barriers/Planning  Goal: Patient's continuum of care needs will be met  Outcome: PROGRESSING AS EXPECTED  Abd MRI reveals a stone in the bile duct. Patient plans for MRCP today.    Problem: Pain Management  Goal: Pain level will decrease to patient's comfort goal  Outcome: PROGRESSING AS EXPECTED  Patient utilizes prn pain medications, heat packs, and distractions for pain management.

## 2018-04-29 ENCOUNTER — APPOINTMENT (OUTPATIENT)
Dept: RADIOLOGY | Facility: MEDICAL CENTER | Age: 28
DRG: 446 | End: 2018-04-29
Attending: INTERNAL MEDICINE
Payer: MEDICAID

## 2018-04-29 LAB
ALBUMIN SERPL BCP-MCNC: 3.3 G/DL (ref 3.2–4.9)
ALBUMIN/GLOB SERPL: 1.3 G/DL
ALP SERPL-CCNC: 134 U/L (ref 30–99)
ALT SERPL-CCNC: 271 U/L (ref 2–50)
ANION GAP SERPL CALC-SCNC: 6 MMOL/L (ref 0–11.9)
AST SERPL-CCNC: 103 U/L (ref 12–45)
BILIRUB SERPL-MCNC: 1.2 MG/DL (ref 0.1–1.5)
BUN SERPL-MCNC: 7 MG/DL (ref 8–22)
CALCIUM SERPL-MCNC: 8.3 MG/DL (ref 8.5–10.5)
CHLORIDE SERPL-SCNC: 109 MMOL/L (ref 96–112)
CO2 SERPL-SCNC: 23 MMOL/L (ref 20–33)
CREAT SERPL-MCNC: 0.98 MG/DL (ref 0.5–1.4)
ERYTHROCYTE [DISTWIDTH] IN BLOOD BY AUTOMATED COUNT: 46.5 FL (ref 35.9–50)
GLOBULIN SER CALC-MCNC: 2.5 G/DL (ref 1.9–3.5)
GLUCOSE SERPL-MCNC: 95 MG/DL (ref 65–99)
HCT VFR BLD AUTO: 37.5 % (ref 37–47)
HGB BLD-MCNC: 12.4 G/DL (ref 12–16)
MCH RBC QN AUTO: 30.3 PG (ref 27–33)
MCHC RBC AUTO-ENTMCNC: 33.1 G/DL (ref 33.6–35)
MCV RBC AUTO: 91.7 FL (ref 81.4–97.8)
PLATELET # BLD AUTO: 226 K/UL (ref 164–446)
PMV BLD AUTO: 9.6 FL (ref 9–12.9)
POTASSIUM SERPL-SCNC: 4 MMOL/L (ref 3.6–5.5)
PROT SERPL-MCNC: 5.8 G/DL (ref 6–8.2)
RBC # BLD AUTO: 4.09 M/UL (ref 4.2–5.4)
SODIUM SERPL-SCNC: 138 MMOL/L (ref 135–145)
WBC # BLD AUTO: 6.6 K/UL (ref 4.8–10.8)

## 2018-04-29 PROCEDURE — 160048 HCHG OR STATISTICAL LEVEL 1-5: Performed by: INTERNAL MEDICINE

## 2018-04-29 PROCEDURE — 770006 HCHG ROOM/CARE - MED/SURG/GYN SEMI*

## 2018-04-29 PROCEDURE — 700111 HCHG RX REV CODE 636 W/ 250 OVERRIDE (IP): Performed by: INTERNAL MEDICINE

## 2018-04-29 PROCEDURE — 700101 HCHG RX REV CODE 250

## 2018-04-29 PROCEDURE — 160009 HCHG ANES TIME/MIN: Performed by: INTERNAL MEDICINE

## 2018-04-29 PROCEDURE — A9270 NON-COVERED ITEM OR SERVICE: HCPCS | Performed by: INTERNAL MEDICINE

## 2018-04-29 PROCEDURE — 99232 SBSQ HOSP IP/OBS MODERATE 35: CPT | Performed by: HOSPITALIST

## 2018-04-29 PROCEDURE — 0FC98ZZ EXTIRPATION OF MATTER FROM COMMON BILE DUCT, VIA NATURAL OR ARTIFICIAL OPENING ENDOSCOPIC: ICD-10-PCS | Performed by: INTERNAL MEDICINE

## 2018-04-29 PROCEDURE — 700102 HCHG RX REV CODE 250 W/ 637 OVERRIDE(OP)

## 2018-04-29 PROCEDURE — 502240 HCHG MISC OR SUPPLY RC 0272: Performed by: INTERNAL MEDICINE

## 2018-04-29 PROCEDURE — 80053 COMPREHEN METABOLIC PANEL: CPT

## 2018-04-29 PROCEDURE — 160002 HCHG RECOVERY MINUTES (STAT): Performed by: INTERNAL MEDICINE

## 2018-04-29 PROCEDURE — 160035 HCHG PACU - 1ST 60 MINS PHASE I: Performed by: INTERNAL MEDICINE

## 2018-04-29 PROCEDURE — 500066 HCHG BITE BLOCK, ECT: Performed by: INTERNAL MEDICINE

## 2018-04-29 PROCEDURE — 74328 X-RAY BILE DUCT ENDOSCOPY: CPT

## 2018-04-29 PROCEDURE — 700111 HCHG RX REV CODE 636 W/ 250 OVERRIDE (IP)

## 2018-04-29 PROCEDURE — 160208 HCHG ENDO MINUTES - EA ADDL 1 MIN LEVEL 4: Performed by: INTERNAL MEDICINE

## 2018-04-29 PROCEDURE — 85027 COMPLETE CBC AUTOMATED: CPT

## 2018-04-29 PROCEDURE — 700105 HCHG RX REV CODE 258: Performed by: INTERNAL MEDICINE

## 2018-04-29 PROCEDURE — 700102 HCHG RX REV CODE 250 W/ 637 OVERRIDE(OP): Performed by: INTERNAL MEDICINE

## 2018-04-29 PROCEDURE — 36415 COLL VENOUS BLD VENIPUNCTURE: CPT

## 2018-04-29 PROCEDURE — 160036 HCHG PACU - EA ADDL 30 MINS PHASE I: Performed by: INTERNAL MEDICINE

## 2018-04-29 PROCEDURE — A9270 NON-COVERED ITEM OR SERVICE: HCPCS

## 2018-04-29 PROCEDURE — BF101ZZ FLUOROSCOPY OF BILE DUCTS USING LOW OSMOLAR CONTRAST: ICD-10-PCS | Performed by: INTERNAL MEDICINE

## 2018-04-29 PROCEDURE — 110371 HCHG SHELL REV 272: Performed by: INTERNAL MEDICINE

## 2018-04-29 PROCEDURE — 160203 HCHG ENDO MINUTES - 1ST 30 MINS LEVEL 4: Performed by: INTERNAL MEDICINE

## 2018-04-29 RX ORDER — OXYCODONE HCL 5 MG/5 ML
SOLUTION, ORAL ORAL
Status: COMPLETED
Start: 2018-04-29 | End: 2018-04-29

## 2018-04-29 RX ADMIN — STANDARDIZED SENNA CONCENTRATE AND DOCUSATE SODIUM 2 TABLET: 8.6; 5 TABLET, FILM COATED ORAL at 21:45

## 2018-04-29 RX ADMIN — OXYCODONE HYDROCHLORIDE 10 MG: 5 SOLUTION ORAL at 17:03

## 2018-04-29 RX ADMIN — PROCHLORPERAZINE EDISYLATE 5 MG: 5 INJECTION INTRAMUSCULAR; INTRAVENOUS at 17:20

## 2018-04-29 RX ADMIN — MORPHINE SULFATE 1 MG: 4 INJECTION INTRAVENOUS at 03:54

## 2018-04-29 RX ADMIN — PROCHLORPERAZINE EDISYLATE 10 MG: 5 INJECTION INTRAMUSCULAR; INTRAVENOUS at 03:53

## 2018-04-29 RX ADMIN — ONDANSETRON HYDROCHLORIDE 4 MG: 2 INJECTION, SOLUTION INTRAMUSCULAR; INTRAVENOUS at 13:14

## 2018-04-29 RX ADMIN — MORPHINE SULFATE 1 MG: 4 INJECTION INTRAVENOUS at 21:45

## 2018-04-29 RX ADMIN — FENTANYL CITRATE 50 MCG: 50 INJECTION, SOLUTION INTRAMUSCULAR; INTRAVENOUS at 17:24

## 2018-04-29 RX ADMIN — ONDANSETRON HYDROCHLORIDE 4 MG: 2 INJECTION, SOLUTION INTRAMUSCULAR; INTRAVENOUS at 21:42

## 2018-04-29 RX ADMIN — SODIUM CHLORIDE: 9 INJECTION, SOLUTION INTRAVENOUS at 04:00

## 2018-04-29 ASSESSMENT — PAIN SCALES - GENERAL
PAINLEVEL_OUTOF10: 5
PAINLEVEL_OUTOF10: 4
PAINLEVEL_OUTOF10: 7
PAINLEVEL_OUTOF10: 0
PAINLEVEL_OUTOF10: 7
PAINLEVEL_OUTOF10: 7
PAINLEVEL_OUTOF10: 0

## 2018-04-29 ASSESSMENT — ENCOUNTER SYMPTOMS
ABDOMINAL PAIN: 1
RESPIRATORY NEGATIVE: 1
CARDIOVASCULAR NEGATIVE: 1
PSYCHIATRIC NEGATIVE: 1
NAUSEA: 1
MUSCULOSKELETAL NEGATIVE: 1
DIARRHEA: 1
VOMITING: 0
NEUROLOGICAL NEGATIVE: 1

## 2018-04-29 NOTE — CARE PLAN
Problem: Bowel/Gastric:  Goal: Normal bowel function is maintained or improved  Outcome: PROGRESSING AS EXPECTED  Nausea is managed at this time, patient has not had emesis all day.    Problem: Pain Management  Goal: Pain level will decrease to patient's comfort goal  Outcome: PROGRESSING AS EXPECTED  Patient managing pain with PRN medications, heat packs and distraction

## 2018-04-29 NOTE — PROGRESS NOTES
Gastroenterology Progress Note     Author: Kvng Harrell   Date & Time Created: 4/29/2018 3:13 PM    Chief Complaint:  Abdominal pain     Interval History:  Overnight N/V, abdominal pain radiating to back. Denies fever, chills, chest pain, SOB, GI bleeding.     Review of Systems:  ROS    Physical Exam:  Physical Exam   Constitutional: She is oriented to person, place, and time. She appears well-developed and well-nourished.   HENT:   Head: Normocephalic and atraumatic.   Eyes: Conjunctivae are normal. Pupils are equal, round, and reactive to light.   Neck: Normal range of motion. Neck supple.   Cardiovascular: Normal rate, regular rhythm and normal heart sounds.    Pulmonary/Chest: Effort normal and breath sounds normal. No respiratory distress.   Abdominal: Soft. She exhibits no distension. There is no rebound and no guarding.   +RUQ TTP mild    Musculoskeletal: Normal range of motion.   Neurological: She is alert and oriented to person, place, and time.   Skin: Skin is warm and dry.   Psychiatric: She has a normal mood and affect. Her behavior is normal.       Labs:        Invalid input(s): [id number]      Recent Labs      04/27/18 0412 04/28/18 0324 04/29/18   0311   SODIUM  139  134*  138   POTASSIUM  4.1  4.3  4.0   CHLORIDE  107  106  109   CO2  19*  19*  23   BUN  10  6*  7*   CREATININE  0.83  0.85  0.98   MAGNESIUM  1.9   --    --    CALCIUM  8.8  8.5  8.3*     Recent Labs      04/26/18   1536  04/27/18 0412  04/28/18   0324 04/29/18   0311   ALTSGPT  569*  441*  324*  271*   ASTSGOT  236*  150*  121*  103*   ALKPHOSPHAT  144*  147*  137*  134*   TBILIRUBIN  3.3*  2.4*  1.7*  1.2   LIPASE  22   --    --    --    GLUCOSE  92  67  83  95     Recent Labs      04/27/18 0412  04/28/18   0324  04/29/18   0310   RBC  4.37  4.41  4.09*   HEMOGLOBIN  13.3  13.1  12.4   HEMATOCRIT  41.1  41.3  37.5   PLATELETCT  238  231  226     Recent Labs      04/26/18   1536  04/27/18   0412  04/28/18   0324   18   0310  18   0311   WBC  7.2  6.0  6.6  6.6   --    NEUTSPOLYS  76.40*  69.20  60.60   --    --    LYMPHOCYTES  12.50*  21.50*  27.90   --    --    MONOCYTES  5.80  5.80  6.60   --    --    EOSINOPHILS  4.20  2.50  3.80   --    --    BASOPHILS  0.70  0.70  0.80   --    --    ASTSGOT  236*  150*  121*   --   103*   ALTSGPT  569*  441*  324*   --   271*   ALKPHOSPHAT  144*  147*  137*   --   134*   TBILIRUBIN  3.3*  2.4*  1.7*   --   1.2     Hemodynamics:  Temp (24hrs), Av.2 °C (98.9 °F), Min:36.9 °C (98.4 °F), Max:37.3 °C (99.2 °F)  Temperature: 37.3 °C (99.1 °F)  Pulse  Av.1  Min: 63  Max: 89   Blood Pressure: 106/75     Respiratory:    Respiration: 16, Pulse Oximetry: 95 %           Fluids:    Intake/Output Summary (Last 24 hours) at 18 1513  Last data filed at 18 1300   Gross per 24 hour   Intake             2800 ml   Output              102 ml   Net             2698 ml        GI/Nutrition:  Orders Placed This Encounter   Procedures   • DIET NPO     Standing Status:   Standing     Number of Occurrences:   8     Order Specific Question:   Restrict to:     Answer:   Strict [1]     Medical Decision Making, by Problem:  Active Hospital Problems    Diagnosis   • *Common bile duct stone [K80.50]   • Elevated LFTs [R79.89]   • Hepatic steatosis [K76.0]       Plan:  1.  Choledocholithiasis.  ERCP now. Patient has no signs of cholangitis at   this time.  No need for antibiotics as of yet.  We will plan for ERCP with sphincterotomy   and stone extraction with or without biliary stenting, depending on what we   find during the time of the procedure.  If we were able to successfully   extract the choledocholith, then patient will require no further operative   treatment.  If the patient is unable to be accessed through ERCP, may need   surgical evaluation; however, this is unlikely.  The patient understands the   risks and benefits of the procedure including perforation, infection,    bleeding, and post-ERCP pancreatitis.  The patient is at increased risk for   post-ERCP pancreatitis that she is a young female; however, we will take all   appropriate measures to try to prevent this.  Patient understands and wishes   to proceed.  2.  Abdominal pain secondary to above.  3.  Hyperbilirubinemia secondary to choledocholithiasis.  Plan for ERCP.  4.  Cholelithiasis history.  Patient is status post cholecystectomy in January 2018.    Quality-Core Measures

## 2018-04-29 NOTE — PROGRESS NOTES
Patient AxOx4, VSS, medicated for 7/10 pain in the abdomen. Patient tolerating clears, NPO at midnight, and denies nausea at this time. Patient voiding adequately. Passing gas, abd soft, LBM PTA. All needs met at this time, call light in reach.

## 2018-04-29 NOTE — PROGRESS NOTES
Assumed care at 0700. Received report from night shift RN. Bedside report completed. AOx4.    Denies pain.  Denies nausea. Npo for procedure today. +voiding. -BM, +flatus    IVF infusing. Ambulating with steady gait.   Pt call light and belongings within reach, fall precautions in place.

## 2018-04-29 NOTE — PROGRESS NOTES
[facility] Hospitalist Progress Note    Date of Service: 2018    Chief Complaint  27 y.o. female admitted 2018 with ruq abdominal pain, n/v    Interval Problem Update  Still has nausea    ruq pain, sharp, intensity 3/10, intermittent no radiation    Afebrile    mrcp shows CBD stone    I have consulted dr mas of GI today    Consultants/Specialty  GI    Disposition  home        Review of Systems   Constitutional: Positive for malaise/fatigue.   HENT: Negative.    Respiratory: Negative.    Cardiovascular: Negative.    Gastrointestinal: Positive for abdominal pain, diarrhea and nausea. Negative for vomiting.   Genitourinary: Negative.    Musculoskeletal: Negative.    Neurological: Negative.    Psychiatric/Behavioral: Negative.    All other systems reviewed and are negative.     Physical Exam  Laboratory/Imaging   Hemodynamics  Temp (24hrs), Av.9 °C (98.4 °F), Min:36.1 °C (97 °F), Max:37.3 °C (99.1 °F)   Temperature: 37.3 °C (99.1 °F)  Pulse  Av.3  Min: 63  Max: 82    Blood Pressure: 100/69      Respiratory      Respiration: 17, Pulse Oximetry: 97 %             Fluids    Intake/Output Summary (Last 24 hours) at 18 2132  Last data filed at 18 2000   Gross per 24 hour   Intake             2800 ml   Output                0 ml   Net             2800 ml       Nutrition  Orders Placed This Encounter   Procedures   • DIET ORDER     Standing Status:   Standing     Number of Occurrences:   1     Order Specific Question:   Diet:     Answer:   Clear Liquids - No Red Foods [12]   • DIET NPO     Standing Status:   Standing     Number of Occurrences:   8     Order Specific Question:   Restrict to:     Answer:   Strict [1]     Physical Exam   Constitutional: She is oriented to person, place, and time. No distress.   HENT:   Head: Normocephalic and atraumatic.   Eyes: Left eye exhibits no discharge.   Neck: No tracheal deviation present. No thyromegaly present.   Cardiovascular: Normal rate.  Exam reveals  no friction rub.    No murmur heard.  Pulmonary/Chest: No respiratory distress. She has no wheezes. She has no rales. She exhibits no tenderness.   Abdominal: She exhibits distension. She exhibits no mass. There is tenderness (epigastric). There is no rebound and no guarding.   Musculoskeletal: She exhibits no edema or deformity.   Neurological: She is alert and oriented to person, place, and time. No cranial nerve deficit. Coordination normal.   Skin: No rash noted. She is not diaphoretic. No erythema. No pallor.       Recent Labs      04/26/18   1536  04/27/18   0412  04/28/18   0324   WBC  7.2  6.0  6.6   RBC  4.71  4.37  4.41   HEMOGLOBIN  14.3  13.3  13.1   HEMATOCRIT  42.7  41.1  41.3   MCV  90.7  94.1  93.7   MCH  30.4  30.4  29.7   MCHC  33.5*  32.4*  31.7*   RDW  45.6  47.7  46.3   PLATELETCT  268  238  231   MPV  9.5  9.6  9.1     Recent Labs      04/26/18   1536  04/27/18   0412  04/28/18   0324   SODIUM  139  139  134*   POTASSIUM  4.1  4.1  4.3   CHLORIDE  104  107  106   CO2  23  19*  19*   GLUCOSE  92  67  83   BUN  10  10  6*   CREATININE  0.84  0.83  0.85   CALCIUM  9.8  8.8  8.5                      Assessment/Plan     * Common bile duct stone- (present on admission)   Assessment & Plan    mrcp shows CBD stone    -Pain control and IV fluids   Clear liquids  Digestive health consulted  Npo after midnight            Hepatic steatosis- (present on admission)   Assessment & Plan    -Secondary to metabolic syndrome        Elevated LFTs- (present on admission)   Assessment & Plan    -See above          Quality-Core Measures   Singleton catheter::  No Singleton  DVT prophylaxis pharmacological::  Not indicated at this time, ambulatory      Check am cbc  Check am cmp

## 2018-04-30 VITALS
TEMPERATURE: 97.8 F | HEIGHT: 67 IN | WEIGHT: 211 LBS | BODY MASS INDEX: 33.12 KG/M2 | OXYGEN SATURATION: 92 % | DIASTOLIC BLOOD PRESSURE: 72 MMHG | HEART RATE: 78 BPM | SYSTOLIC BLOOD PRESSURE: 113 MMHG | RESPIRATION RATE: 18 BRPM

## 2018-04-30 PROBLEM — R79.89 ELEVATED LFTS: Status: RESOLVED | Noted: 2018-04-26 | Resolved: 2018-04-30

## 2018-04-30 PROBLEM — K80.50 COMMON BILE DUCT STONE: Status: RESOLVED | Noted: 2018-04-26 | Resolved: 2018-04-30

## 2018-04-30 PROBLEM — K76.0 HEPATIC STEATOSIS: Status: RESOLVED | Noted: 2018-04-26 | Resolved: 2018-04-30

## 2018-04-30 LAB
ALBUMIN SERPL BCP-MCNC: 3.4 G/DL (ref 3.2–4.9)
ALBUMIN/GLOB SERPL: 1.4 G/DL
ALP SERPL-CCNC: 185 U/L (ref 30–99)
ALT SERPL-CCNC: 336 U/L (ref 2–50)
ANION GAP SERPL CALC-SCNC: 6 MMOL/L (ref 0–11.9)
AST SERPL-CCNC: 183 U/L (ref 12–45)
BILIRUB SERPL-MCNC: 1.5 MG/DL (ref 0.1–1.5)
BUN SERPL-MCNC: 6 MG/DL (ref 8–22)
CALCIUM SERPL-MCNC: 8.8 MG/DL (ref 8.5–10.5)
CHLORIDE SERPL-SCNC: 107 MMOL/L (ref 96–112)
CO2 SERPL-SCNC: 26 MMOL/L (ref 20–33)
CREAT SERPL-MCNC: 0.78 MG/DL (ref 0.5–1.4)
ERYTHROCYTE [DISTWIDTH] IN BLOOD BY AUTOMATED COUNT: 46.9 FL (ref 35.9–50)
GLOBULIN SER CALC-MCNC: 2.4 G/DL (ref 1.9–3.5)
GLUCOSE SERPL-MCNC: 131 MG/DL (ref 65–99)
HCT VFR BLD AUTO: 38.4 % (ref 37–47)
HGB BLD-MCNC: 12.3 G/DL (ref 12–16)
MCH RBC QN AUTO: 29.6 PG (ref 27–33)
MCHC RBC AUTO-ENTMCNC: 32 G/DL (ref 33.6–35)
MCV RBC AUTO: 92.3 FL (ref 81.4–97.8)
PLATELET # BLD AUTO: 236 K/UL (ref 164–446)
PMV BLD AUTO: 9.6 FL (ref 9–12.9)
POTASSIUM SERPL-SCNC: 4.2 MMOL/L (ref 3.6–5.5)
PROT SERPL-MCNC: 5.8 G/DL (ref 6–8.2)
RBC # BLD AUTO: 4.16 M/UL (ref 4.2–5.4)
SODIUM SERPL-SCNC: 139 MMOL/L (ref 135–145)
WBC # BLD AUTO: 5.9 K/UL (ref 4.8–10.8)

## 2018-04-30 PROCEDURE — 700105 HCHG RX REV CODE 258: Performed by: INTERNAL MEDICINE

## 2018-04-30 PROCEDURE — 36415 COLL VENOUS BLD VENIPUNCTURE: CPT

## 2018-04-30 PROCEDURE — 80053 COMPREHEN METABOLIC PANEL: CPT

## 2018-04-30 PROCEDURE — 99239 HOSP IP/OBS DSCHRG MGMT >30: CPT | Performed by: HOSPITALIST

## 2018-04-30 PROCEDURE — 85027 COMPLETE CBC AUTOMATED: CPT

## 2018-04-30 RX ORDER — ONDANSETRON 4 MG/1
4 TABLET, ORALLY DISINTEGRATING ORAL EVERY 4 HOURS PRN
Qty: 10 TAB | Refills: 0 | Status: SHIPPED | OUTPATIENT
Start: 2018-04-30

## 2018-04-30 RX ADMIN — SODIUM CHLORIDE: 9 INJECTION, SOLUTION INTRAVENOUS at 06:03

## 2018-04-30 NOTE — CARE PLAN
Problem: Knowledge Deficit  Goal: Knowledge of disease process/condition, treatment plan, diagnostic tests, and medications will improve    Intervention: Assess knowledge level of disease process/condition, treatment plan, diagnostic tests, and medications  Discussed plan of care with pt; all questions answered to pt satisfaction at this time          Problem: Mobility  Goal: Risk for activity intolerance will decrease    Intervention: Encourage patient to increase activity level in collaboration with Interdisciplinary Team  Discussed importance of increased ambulation.

## 2018-04-30 NOTE — OP REPORT
DATE OF SERVICE:  04/29/2018    ENDOSCOPIST:  Kvng Harrell DO    PREOPERATIVE DIAGNOSIS:  Choledocholithiasis.    POSTOPERATIVE DIAGNOSIS:  Choledocholithiasis.    ANESTHESIA:  General anesthesia per anesthesiologist in the operating room.    DESCRIPTION OF PROCEDURE:  After informed consent and appropriate sedation,   the duodenoscope was advanced through the oropharynx into the stomach through   to the second portion of the duodenum.  The ampulla was easily identified.    The scope was shortened and brought into good position.  A papillotome with   0.025 wire was used to cannulate on first attempt, it went into the common   bile duct and was advanced into the intrahepatics.  This was confirmed on   cholangiogram.  At that point, a cholangiogram was obtained showing   choledocholithiasis with one singular stone in the mid to distal common bile   duct.  The common bile duct was dilated to approximately 9-10 mm and the stone   was approximately 4-6 mm in diameter.  There was good filling of the common   hepatic duct and intrahepatics.  At that time, a moderate size sphincterotomy   was performed with good hemostasis.  The papillotome was then withdrawn and   9-12 biliary balloon was advanced.  After numerous balloon sweeps, the stone   had not been extracted and was still in the common bile duct, so the 9-12   biliary balloon was withdrawn and papillotome was advanced.  Again, the   sphincterotomy was extended to be a large sphincterotomy also with good   hemostasis.  At that time, the sphincterotome was withdrawn and the 9-12   biliary balloon was replaced back into the biliary system.  Two more attempts   at balloon sweeps were successful and an obvious 5 mm stone was extracted out   of the common bile duct.  At that time, an occlusion cholangiogram was   obtained showing good filling of the common bile duct, common hepatic duct and   intrahepatics with no remaining filling defects.  The guidewire and balloon   were  then withdrawn successfully and a repeat cholangiogram was obtained to   confirm filling of the intrahepatics up higher.  However, there was artifact   in the common bile duct at that time, likely representing bubbles.  I have no   concern for any further stones in here or in the common bile duct.  Bowel was   decompressed and scope was withdrawn and there were no immediate postop   complications.    RECOMMENDATIONS:  1.  Check a.m. CMP.  2.  As long as patient has no signs of post-ERCP pancreatitis or worsening   symptoms and liver function tests continue to improve, I anticipate this   patient should be able to be discharged tomorrow.  3.  Clear liquid diet, advance as tolerated to regular.  4.  Please call if you have any questions or concerns.       ____________________________________     DO FLAVIA Cruz / NIKOLE    DD:  04/29/2018 16:40:10  DT:  04/29/2018 17:03:59    D#:  [id number]  Job#:  [id number]

## 2018-04-30 NOTE — PROGRESS NOTES
Patient is feeling ready for regular food.  Changed diet order after reading Dr. Harrell's note to advance to regular.  Patient thinks the reason she has not been feeling well is because she is not eating.

## 2018-04-30 NOTE — PROGRESS NOTES
Assumed care at 0700. Received report from night shift RN. Bedside report completed. AOx4.      Denies pain.  Denies nausea.  Tolerating diet. +voiding. -BM, +flatus      IVF infusing. Ambulating with steady gait.     Pt call light and belongings within reach, fall precautions in place.

## 2018-04-30 NOTE — PROGRESS NOTES
Discharging Patient home per physician order.  Discharged with Mother.  Demonstrated understanding of discharge instructions, follow up appointments, home medications, prescriptions, and nursing care instructions.  Ambulating without assistance, voiding without difficulty, pain well controlled, tolerating oral medications, oxygen saturation greater than 90%, tolerating diet.  Educational handouts given and discussed.  Verbalized understanding of discharge instructions and educational handouts.  All questions answered.  Belongings with patient at time of discharge.

## 2018-04-30 NOTE — CARE PLAN
Problem: Safety  Goal: Will remain free from injury  Outcome: PROGRESSING AS EXPECTED  Patient educated about wearing the skid-proof socks, and call for assistance in the middle of the night, patient's room is clean from debris, patient knows where the bathroom is located and how to use call light for assistance    Problem: Bowel/Gastric:  Goal: Normal bowel function is maintained or improved  Outcome: PROGRESSING AS EXPECTED  Patient educated about the bowel protocol and encouraged to ambulate as tolerated to wake up and improve bowel function

## 2018-04-30 NOTE — OR NURSING
Pt in PACU in no apparent distress. VSS Tolerates clear liquids. States pain is tolerable, no nausea. Mom called and updated. All questions answered.

## 2018-04-30 NOTE — PROGRESS NOTES
Pt back from procedure.  No needs at this time.  Denies nausea.  Tolerating clears. Denies pain. +voiding.   Call light within reach.

## 2018-04-30 NOTE — DISCHARGE INSTRUCTIONS
Discharge Instructions    Discharged to home by car with relative. Discharged via wheelchair, hospital escort: Yes.  Special equipment needed: Not Applicable    Be sure to schedule a follow-up appointment with your primary care doctor or any specialists as instructed.     Discharge Plan:   Influenza Vaccine Indication: Patient Refuses    I understand that a diet low in cholesterol, fat, and sodium is recommended for good health. Unless I have been given specific instructions below for another diet, I accept this instruction as my diet prescription.   Other diet: Regular diet as tolerated    Special Instructions: None    · Is patient discharged on Warfarin / Coumadin?   No       Endoscopic Retrograde Cholangiopancreatography (ERCP)  Endoscopic retrograde cholangiopancreatography (ERCP) is a procedure used to diagnosis many diseases of the pancreas, bile ducts, liver, and gallbladder. During ERCP a thin, lighted tube (endoscope) is passed through the mouth and down the back of the throat into the first part of the small intestine (duodenum). A small, plastic tube (cannula) is then passed through the endoscope and directed into the bile duct or pancreatic duct. Dye is then injected through the cannula and X-rays are taken to study the biliary and pancreatic passageways.   LET YOUR HEALTH CARE PROVIDER KNOW ABOUT:   · Any allergies you have.    · All medicines you are taking, including vitamins, herbs, eyedrops, creams, and over-the-counter medicines.    · Previous problems you or members of your family have had with the use of anesthetics.    · Any blood disorders you have.    · Previous surgeries you have had.    · Medical conditions you have.  RISKS AND COMPLICATIONS  Generally, ERCP is a safe procedure. However, as with any procedure, complications can occur. A simple removal of gallstones has the lowest rate of complications. Higher rates of complication occur in people who have poorly functioning bile or  pancreatic ducts. Possible complications include:   · Pancreatitis.  · Bleeding.  · Accidental punctures in the bowel wall, pancreas, or gall bladder.  · Gall bladder or bile duct infection.  BEFORE THE PROCEDURE   · Do not eat or drink anything, including water, for at least 8 hours before the procedure or as directed by your health care provider.    · Ask your health care provider whether you should stop taking certain medicines prior to your procedure.    · Arrange for someone to drive you home. You will not be allowed to drive for 12-24 hours after the procedure.  PROCEDURE   · You will be given medicine through a vein (intravenously) to make you relaxed and sleepy.    · You might have a breathing tube placed to give you medicine that makes you sleep (general anesthetic).    · Your throat may be sprayed with medicine that numbs the area and prevents gagging (local anesthetic), or you may gargle this medicine.    · You will lie on your left side.    · The endoscope will be inserted through your mouth and into the duodenum. The tube will not interfere with your breathing. Gagging is prevented by the anesthesia.    · While X-rays are being taken, you may be positioned on your stomach.    · A small sample of tissue (biopsy) may be removed for examination.  AFTER THE PROCEDURE   · You will rest in bed until you are fully conscious.    · When you first wake up, your throat may feel slightly sore.    · You will not be allowed to eat or drink until numbness subsides.    · Once you are able to drink, urinate, and sit on the edge of the bed without feeling sick to your stomach (nauseous) or dizzy, you may be allowed to go home.  This information is not intended to replace advice given to you by your health care provider. Make sure you discuss any questions you have with your health care provider.  Document Released: 09/12/2002 Document Revised: 10/08/2014 Document Reviewed: 07/29/2014  [employer] Patient  Education © 2017 [employer]      Endoscopic Retrograde Cholangiopancreatography (ERCP), Care After  Refer to this sheet in the next few weeks. These instructions provide you with information on caring for yourself after your procedure. Your health care provider may also give you more specific instructions. Your treatment has been planned according to current medical practices, but problems sometimes occur. Call your health care provider if you have any problems or questions after your procedure.   WHAT TO EXPECT AFTER THE PROCEDURE   After your procedure, it is typical to feel:   · Soreness in your throat.    · Sick to your stomach (nauseous).    · Bloated.  · Dizzy.    · Fatigued.  HOME CARE INSTRUCTIONS  · Have a friend or family member stay with you for the first 24 hours after your procedure.  · Start taking your usual medicines and eating normally as soon as you feel well enough to do so or as directed by your health care provider.  SEEK MEDICAL CARE IF:  · You have abdominal pain.    · You develop signs of infection, such as:    ¨ Chills.    ¨ Feeling unwell.    SEEK IMMEDIATE MEDICAL CARE IF:  · You have difficulty swallowing.  · You have worsening throat, chest, or abdominal pain.  · You vomit.  · You have bloody or very black stools.  · You have a fever.     This information is not intended to replace advice given to you by your health care provider. Make sure you discuss any questions you have with your health care provider.     Document Released: 10/08/2014 Document Reviewed: 10/08/2014  [employer] Interactive Patient Education ©2016 [employer]      Depression / Suicide Risk    As you are discharged from this [facility] facility, it is important to learn how to keep safe from harming yourself.    Recognize the warning signs:  · Abrupt changes in personality, positive or negative- including increase in energy   · Giving away possessions  · Change in eating patterns- significant weight changes-   positive or negative  · Change in sleeping patterns- unable to sleep or sleeping all the time   · Unwillingness or inability to communicate  · Depression  · Unusual sadness, discouragement and loneliness  · Talk of wanting to die  · Neglect of personal appearance   · Rebelliousness- reckless behavior  · Withdrawal from people/activities they love  · Confusion- inability to concentrate     If you or a loved one observes any of these behaviors or has concerns about self-harm, here's what you can do:  · Talk about it- your feelings and reasons for harming yourself  · Remove any means that you might use to hurt yourself (examples: pills, rope, extension cords, firearm)  · Get professional help from the community (Mental Health, Substance Abuse, psychological counseling)  · Do not be alone:Call your Safe Contact- someone whom you trust who will be there for you.  · Call your local CRISIS HOTLINE 769-6391 or 845-055-0615  · Call your local Children's Mobile Crisis Response Team [place] (540) 376-8339 or www.[employer]  · Call the toll free National Suicide Prevention Hotlines   · National Suicide Prevention Lifeline 605-448-OMBA (5423)  · National Hope Line Network 800-SUICIDE (274-3510)

## 2018-04-30 NOTE — PROGRESS NOTES
[facility] Hospitalist Progress Note    Date of Service: 2018    Chief Complaint  27 y.o. female admitted 2018 with ruq abdominal pain, n/v    Interval Problem Update    ruq pain, sharp, intensity 3/10, intermittent no radiation    Afebrile    mrcp shows CBD stone    Ercp today    Consultants/Specialty  GI    Disposition  home        Review of Systems   Constitutional: Positive for malaise/fatigue.   HENT: Negative.    Respiratory: Negative.    Cardiovascular: Negative.    Gastrointestinal: Positive for abdominal pain, diarrhea and nausea. Negative for vomiting.   Genitourinary: Negative.    Musculoskeletal: Negative.    Neurological: Negative.    Psychiatric/Behavioral: Negative.    All other systems reviewed and are negative.     Physical Exam  Laboratory/Imaging   Hemodynamics  Temp (24hrs), Av.1 °C (98.8 °F), Min:36.8 °C (98.2 °F), Max:37.3 °C (99.2 °F)   Temperature: 36.8 °C (98.2 °F)  Pulse  Av.8  Min: 63  Max: 89 Heart Rate (Monitored): 62  Blood Pressure: 106/75, NIBP: 119/76      Respiratory      Respiration: (!) 22, Pulse Oximetry: 95 %             Fluids    Intake/Output Summary (Last 24 hours) at 18 1800  Last data filed at 18 1700   Gross per 24 hour   Intake             2150 ml   Output              102 ml   Net             2048 ml       Nutrition  Orders Placed This Encounter   Procedures   • DIET ORDER     Standing Status:   Standing     Number of Occurrences:   1     Order Specific Question:   Diet:     Answer:   Clear Liquid [10]     Physical Exam   Constitutional: She is oriented to person, place, and time. No distress.   HENT:   Head: Normocephalic and atraumatic.   Eyes: Left eye exhibits no discharge.   Neck: No tracheal deviation present. No thyromegaly present.   Cardiovascular: Normal rate.  Exam reveals no friction rub.    No murmur heard.  Pulmonary/Chest: No respiratory distress. She has no wheezes. She has no rales. She exhibits no tenderness.   Abdominal: She  exhibits distension. She exhibits no mass. There is tenderness (epigastric). There is no rebound and no guarding.   Musculoskeletal: She exhibits no edema or deformity.   Neurological: She is alert and oriented to person, place, and time. No cranial nerve deficit. Coordination normal.   Skin: No rash noted. She is not diaphoretic. No erythema. No pallor.       Recent Labs      04/27/18   0412  04/28/18   0324  04/29/18   0310   WBC  6.0  6.6  6.6   RBC  4.37  4.41  4.09*   HEMOGLOBIN  13.3  13.1  12.4   HEMATOCRIT  41.1  41.3  37.5   MCV  94.1  93.7  91.7   MCH  30.4  29.7  30.3   MCHC  32.4*  31.7*  33.1*   RDW  47.7  46.3  46.5   PLATELETCT  238  231  226   MPV  9.6  9.1  9.6     Recent Labs      04/27/18   0412  04/28/18   0324  04/29/18   0311   SODIUM  139  134*  138   POTASSIUM  4.1  4.3  4.0   CHLORIDE  107  106  109   CO2  19*  19*  23   GLUCOSE  67  83  95   BUN  10  6*  7*   CREATININE  0.83  0.85  0.98   CALCIUM  8.8  8.5  8.3*                      Assessment/Plan     * Common bile duct stone- (present on admission)   Assessment & Plan    mrcp shows CBD stone    -ercp today            Hepatic steatosis- (present on admission)   Assessment & Plan    -Secondary to metabolic syndrome        Elevated LFTs- (present on admission)   Assessment & Plan    -See above          Quality-Core Measures   Singleton catheter::  No Singleton  DVT prophylaxis pharmacological::  Not indicated at this time, ambulatory      Check am cbc  Check am cmp

## 2018-04-30 NOTE — PROGRESS NOTES
Assumed care at 1900 received report from day shift RN.  KARLOS&Ox4.    7/10 Pain medicated per MAR, denies nausea except with medication,   tolerating clear liquid diet,   +voiding, + bowel sounds,  Room air      Patient call light within reach, bed in the lowest position, hourly rounding in place.  POC discussed patient wants to know what time her mother can pick her up tomorrow.

## 2018-05-01 NOTE — DISCHARGE SUMMARY
CHIEF COMPLAINT ON ADMISSION  Chief Complaint   Patient presents with   • RUQ Pain     off and on since monday. Radiates to bad   • N/V     2-3 x emesis a day.        CODE STATUS  Prior    HPI & HOSPITAL COURSE  History of Presenting Illness  27 y.o. female who presented 4/26/2018 with above chief complaint. Patient presents all the way from [facility] and she's had recurrent right upper quadrant pain with associated nausea and vomiting for the last several days. Patient had presented to outside hospital this past Monday in [facility] where she apparently had normal labs but no imaging was sent home with supportive treatment. She describes a right upper quadrant pain as stabbing in nature that comes and goes and is episodic and sometimes wraps around to her back pain seems to be exacerbated by food intake and relieved by no food. She was trying to take some ibuprofen home with little relief and denies any obvious jaundice of skin. She says this pain feels somewhat similar to her prior cholecystectomy. She denies any associated diarrhea but does have some nausea and vomiting with it and the pain at home was a 10 out of 10 and is currently a 5 out of 10. Given that the pain was so severe she finally took one of her mom's Norco's which didn't subside the pain a bit.      She was admitted. She was a MRCP that showed a common bile duct stone. She was kept npo and given ivf and pain management. She was referred  To Gi for  Ercp.  Ercp was sucessful in removing the impacted stone. She was stable for dc on4/30.    The patient met 2-midnight criteria for an inpatient stay at the time of discharge.    Therefore, she is discharged in good and stable condition with close outpatient follow-up.    SPECIFIC OUTPATIENT FOLLOW-UP  pcp 1 week    DISCHARGE PROBLEM LIST  Principal Problem (Resolved):    Common bile duct stone POA: Yes  Active Problems:    * No active hospital problems. *  Resolved Problems:    Elevated LFTs POA:  Yes    Hepatic steatosis POA: Yes      FOLLOW UP  No future appointments.  Primary Care Provider    In 1 week        MEDICATIONS ON DISCHARGE   Nila Ferro   Home Medication Instructions PATTI:[id number]    Printed on:05/01/18 1008   Medication Information                      amoxicillin (AMOXIL) 500 MG Cap  Take 500 mg by mouth 4 times a day.             doxycycline (VIBRAMYCIN) 100 MG Tab  Take 100 mg by mouth 2 times a day. Pt started 10 day course of DOXYCYCLINE 4/26             ibuprofen (MOTRIN) 800 MG Tab  Take 800 mg by mouth every 8 hours as needed (pain).             ondansetron (ZOFRAN ODT) 4 MG TABLET DISPERSIBLE  Take 1 Tab by mouth every four hours as needed (give PO if no IV route available).                 DIET  No orders of the defined types were placed in this encounter.      ACTIVITY  As tolerated.  Weight bearing as tolerated      CONSULTATIONS  Dr camejo    PROCEDURES  DATE OF SERVICE:  04/29/2018     ENDOSCOPIST:  Kvng Harrell DO     PREOPERATIVE DIAGNOSIS:  Choledocholithiasis.     POSTOPERATIVE DIAGNOSIS:  Choledocholithiasis.     ANESTHESIA:  General anesthesia per anesthesiologist in the operating room.     DESCRIPTION OF PROCEDURE:  After informed consent and appropriate sedation,   the duodenoscope was advanced through the oropharynx into the stomach through   to the second portion of the duodenum.  The ampulla was easily identified.    The scope was shortened and brought into good position.  A papillotome with   0.025 wire was used to cannulate on first attempt, it went into the common   bile duct and was advanced into the intrahepatics.  This was confirmed on   cholangiogram.  At that point, a cholangiogram was obtained showing   choledocholithiasis with one singular stone in the mid to distal common bile   duct.  The common bile duct was dilated to approximately 9-10 mm and the stone   was approximately 4-6 mm in diameter.  There was good filling of the common   hepatic duct  and intrahepatics.  At that time, a moderate size sphincterotomy   was performed with good hemostasis.  The papillotome was then withdrawn and   9-12 biliary balloon was advanced.  After numerous balloon sweeps, the stone   had not been extracted and was still in the common bile duct, so the 9-12   biliary balloon was withdrawn and papillotome was advanced.  Again, the   sphincterotomy was extended to be a large sphincterotomy also with good   hemostasis.  At that time, the sphincterotome was withdrawn and the 9-12   biliary balloon was replaced back into the biliary system.  Two more attempts   at balloon sweeps were successful and an obvious 5 mm stone was extracted out   of the common bile duct.  At that time, an occlusion cholangiogram was   obtained showing good filling of the common bile duct, common hepatic duct and   intrahepatics with no remaining filling defects.  The guidewire and balloon   were then withdrawn successfully and a repeat cholangiogram was obtained to   confirm filling of the intrahepatics up higher.  However, there was artifact   in the common bile duct at that time, likely representing bubbles.  I have no   concern for any further stones in here or in the common bile duct.  Bowel was   decompressed and scope was withdrawn and there were no immediate postop   complications.     RECOMMENDATIONS:  1.  Check a.m. CMP.  2.  As long as patient has no signs of post-ERCP pancreatitis or worsening   symptoms and liver function tests continue to improve, I anticipate this   patient should be able to be discharged tomorrow.  3.  Clear liquid diet, advance as tolerated to regular.  4.  Please call if you have any questions or concerns.        ____________________________________     Kvng Harrell DO     RS / [facility]    LABORATORY  Lab Results   Component Value Date/Time    SODIUM 139 04/30/2018 02:12 AM    POTASSIUM 4.2 04/30/2018 02:12 AM    CHLORIDE 107 04/30/2018 02:12 AM    CO2 26 04/30/2018 02:12 AM     GLUCOSE 131 (H) 04/30/2018 02:12 AM    BUN 6 (L) 04/30/2018 02:12 AM    CREATININE 0.78 04/30/2018 02:12 AM        Lab Results   Component Value Date/Time    WBC 5.9 04/30/2018 02:13 AM    HEMOGLOBIN 12.3 04/30/2018 02:13 AM    HEMATOCRIT 38.4 04/30/2018 02:13 AM    PLATELETCT 236 04/30/2018 02:13 AM        Total time of the discharge process exceeds 40 minutes

## (undated) DEVICE — HEAD HOLDER JUNIOR/ADULT

## (undated) DEVICE — BLOCK BITE ENDOSCOPIC 2809 - (100/BX) INTERMEDIATE

## (undated) DEVICE — FILM CASSETTE ENDO

## (undated) DEVICE — KIT ANESTHESIA W/CIRCUIT & 3/LT BAG W/FILTER (20EA/CA)

## (undated) DEVICE — NEPTUNE 4 PORT MANIFOLD - (20/PK)

## (undated) DEVICE — SUCTION INSTRUMENT YANKAUER BULBOUS TIP W/O VENT (50EA/CA)

## (undated) DEVICE — MASK ANESTHESIA ADULT  - (100/CA)

## (undated) DEVICE — CANISTER SUCTION 3000ML MECHANICAL FILTER AUTO SHUTOFF MEDI-VAC NONSTERILE LF DISP  (40EA/CA)

## (undated) DEVICE — CONTAINER, SPECIMEN, STERILE

## (undated) DEVICE — JAGTOME RX 39 PRELOADED .025 X 260CM

## (undated) DEVICE — ELECTRODE DUAL RETURN W/ CORD - (50/PK)

## (undated) DEVICE — BALLOON RETRIEVAL EXTRACTOR PRO RX   9-12MM

## (undated) DEVICE — KIT CUSTOM PROCEDURE SINGLE FOR ENDO  (15/CA)

## (undated) DEVICE — PROTECTOR ULNA NERVE - (36PR/CA)

## (undated) DEVICE — WIRE GUIDE LOCKING DEVICE (10/BX)